# Patient Record
Sex: MALE | Race: WHITE | Employment: OTHER | URBAN - METROPOLITAN AREA
[De-identification: names, ages, dates, MRNs, and addresses within clinical notes are randomized per-mention and may not be internally consistent; named-entity substitution may affect disease eponyms.]

---

## 2018-11-13 PROBLEM — E11.9 TYPE 2 DIABETES MELLITUS WITHOUT COMPLICATION, WITHOUT LONG-TERM CURRENT USE OF INSULIN (HCC): Status: ACTIVE | Noted: 2018-11-13

## 2019-07-24 ENCOUNTER — TELEPHONE (OUTPATIENT)
Dept: ADMINISTRATIVE | Age: 84
End: 2019-07-24

## 2019-07-24 NOTE — TELEPHONE ENCOUNTER
New pt referred for follow up of CAD of native artery. Appt scheduled with Dr. Jorja Cooks on 9/17/19. Pt has seen Dr. Chi Solis in the past at [address] [facility]. He states he has not seen a cardiologist since his last visit in 2014. Cardiac past history form scanned.

## 2019-09-17 ENCOUNTER — OFFICE VISIT (OUTPATIENT)
Dept: CARDIOLOGY CLINIC | Age: 84
End: 2019-09-17
Payer: MEDICARE

## 2019-09-17 VITALS
HEIGHT: 70 IN | BODY MASS INDEX: 27.92 KG/M2 | RESPIRATION RATE: 16 BRPM | DIASTOLIC BLOOD PRESSURE: 74 MMHG | WEIGHT: 195 LBS | HEART RATE: 59 BPM | SYSTOLIC BLOOD PRESSURE: 132 MMHG

## 2019-09-17 DIAGNOSIS — I25.10 CORONARY ARTERY DISEASE INVOLVING NATIVE CORONARY ARTERY OF NATIVE HEART WITHOUT ANGINA PECTORIS: Primary | Chronic | ICD-10-CM

## 2019-09-17 PROCEDURE — 93000 ELECTROCARDIOGRAM COMPLETE: CPT | Performed by: INTERNAL MEDICINE

## 2019-09-17 PROCEDURE — 99214 OFFICE O/P EST MOD 30 MIN: CPT | Performed by: INTERNAL MEDICINE

## 2020-12-10 ENCOUNTER — OFFICE VISIT (OUTPATIENT)
Dept: CARDIOLOGY CLINIC | Age: 85
End: 2020-12-10
Payer: MEDICARE

## 2020-12-10 VITALS
HEIGHT: 70 IN | BODY MASS INDEX: 27.63 KG/M2 | SYSTOLIC BLOOD PRESSURE: 132 MMHG | WEIGHT: 193 LBS | RESPIRATION RATE: 16 BRPM | DIASTOLIC BLOOD PRESSURE: 72 MMHG | HEART RATE: 52 BPM

## 2020-12-10 PROCEDURE — 93000 ELECTROCARDIOGRAM COMPLETE: CPT | Performed by: INTERNAL MEDICINE

## 2020-12-10 PROCEDURE — 99213 OFFICE O/P EST LOW 20 MIN: CPT | Performed by: INTERNAL MEDICINE

## 2020-12-10 NOTE — PROGRESS NOTES
OUTPATIENT CARDIOLOGY FOLLOW-UP    Name: Alma Han    Age: 82 y.o. Primary Care Physician: Gabby Capone MD    Date of Service: 12/10/2020    Chief Complaint: Follow-up for CAD, PVC's    Interim History:  He was previously followed by Dr. Evens Mcclure; he established care with me in 9/2019. No new cardiac complaints since last cardiology evaluation. He denies recent chest pain, SOB, palpitations, lightheadedness, dizziness, syncope, PND, or orthopnea. He enjoys golfing (3-4 times/week; he used to golf with my grandpa). He is a retired damon. SR on EKG.     Review of Systems:   Cardiac: As per HPI  General: No fever, chills  Pulmonary: As per HPI  HEENT: No visual disturbances, difficult swallowing  GI: No nausea, vomiting  : No dysuria, hematuria  Endocrine: No thyroid disease, +DM  Musculoskeletal: ARTEAGA x 4, no focal motor deficits  Skin: Intact, no rashes  Neuro: No headache, seizures  Psych: Currently with no depression, anxiety    Past Medical History:  Past Medical History:   Diagnosis Date    CAD (coronary artery disease)     followed with Dr. Larry Hameed, as of 6/2015 Dr. Arnulfo Parker. Britni Orellana follows    Hernia cerebri ([facility] [address]     Hyperlipidemia     Hypertension     stable per patient    On aspirin at home     presribed by Dr. Aguila Cadena    Osteoarthritis     left knee    Type II or unspecified type diabetes mellitus without mention of complication, not stated as uncontrolled     since lost weight was taken off sugar pill     Past Surgical History:  Past Surgical History:   Procedure Laterality Date    COLONOSCOPY  6/16/2015    CORONARY ARTERY BYPASS GRAFT  2004    HERNIA REPAIR  1950's    inguinal    JOINT REPLACEMENT Left 2/22/2013    left total knee arthroplasty    TONSILLECTOMY       Family History:  Family History   Problem Relation Age of Onset    Heart Disease Mother     Cancer Father      Social History:  Social History     Socioeconomic History    Marital status:  Spouse name: Not on file    Number of children: Not on file    Years of education: Not on file    Highest education level: Not on file   Occupational History    Not on file   Social Needs    Financial resource strain: Not on file    Food insecurity     Worry: Not on file     Inability: Not on file    Transportation needs     Medical: Not on file     Non-medical: Not on file   Tobacco Use    Smoking status: Former Smoker     Packs/day: 1.50     Years: 25.00     Pack years: 37.50     Types: Cigarettes     Last attempt to quit: 1983     Years since quittin.9    Smokeless tobacco: Never Used   Substance and Sexual Activity    Alcohol use:  Yes     Alcohol/week: 2.0 standard drinks     Types: 2 Cans of beer per week     Comment: occasionally 2 drinks    Drug use: No    Sexual activity: Not on file   Lifestyle    Physical activity     Days per week: Not on file     Minutes per session: Not on file    Stress: Not on file   Relationships    Social connections     Talks on phone: Not on file     Gets together: Not on file     Attends Cheondoism service: Not on file     Active member of club or organization: Not on file     Attends meetings of clubs or organizations: Not on file     Relationship status: Not on file    Intimate partner violence     Fear of current or ex partner: Not on file     Emotionally abused: Not on file     Physically abused: Not on file     Forced sexual activity: Not on file   Other Topics Concern    Not on file   Social History Narrative    Not on file     Allergies:  No Known Allergies    Current Medications:  Current Outpatient Medications   Medication Sig Dispense Refill    amLODIPine (NORVASC) 5 MG tablet Take 1 tablet by mouth daily 90 tablet 1    metFORMIN (GLUCOPHAGE) 500 MG tablet Take 2 tablets by mouth 2 times daily (with meals) 360 tablet 1    metoprolol succinate (TOPROL XL) 50 MG extended release tablet Take 1 tablet by mouth daily 90 tablet 1    rosuvastatin (CRESTOR) 5 MG tablet Take 1 tablet by mouth daily (Patient taking differently: Take 5 mg by mouth every other day TAKES 3 TIMES WEEKLY) 90 tablet 1    Handicap Placard MISC by Does not apply route Valid from 9/26/16 to 9/26/21  Dx osteoarthritis 2 each 0    acetaminophen (TYLENOL) 500 MG tablet   Take 1,000 mg by mouth daily Instructed to take morning of surgery with a sip of water if needs      multivitamin [facility]) per tablet   Take 1 tablet by mouth daily       aspirin 81 MG tablet   Take 81 mg by mouth daily Last dose  to check with Dr. Dione Olivas if needs to hold       No current facility-administered medications for this visit. Physical Exam:  /72   Pulse 52   Resp 16   Ht 5' 10\" (1.778 m)   Wt 193 lb (87.5 kg)   BMI 27.69 kg/m²   Wt Readings from Last 3 Encounters:   12/10/20 193 lb (87.5 kg)   05/11/20 196 lb 3.2 oz (89 kg)   11/11/19 187 lb (84.8 kg)     Appearance: Awake, alert and oriented x 3, no acute respiratory distress  Skin: Intact, no rash  Head: Normocephalic, atraumatic  Eyes: EOMI, no conjunctival erythema  ENMT: No pharyngeal erythema, MMM, no rhinorrhea  Neck: Supple, no carotid bruits  Lungs: Clear to auscultation bilaterally. No wheezes, rales, or rhonchi.   Cardiac: Regular rate and rhythm, 1/6 systolic murmur  Abdomen: Soft, nontender, +bowel sounds  Extremities: Moves all extremities x 4, no lower extremity edema  Neurologic: No focal motor deficits apparent, normal mood and affect, alert and oriented x 3    Laboratory Tests:  Lab Results   Component Value Date    CREATININE 1.0 03/28/2013    BUN 24 (H) 03/28/2013     03/28/2013    K 4.8 03/28/2013     (H) 03/28/2013    CO2 25 03/28/2013     Lab Results   Component Value Date    WBC 8.8 03/26/2013    HGB 11.9 (L) 03/26/2013    HCT 36.8 (L) 03/26/2013    MCV 95.5 03/26/2013     03/26/2013     Lab Results   Component Value Date    ALT 21 03/25/2013    AST 18 03/25/2013    ALKPHOS 67 03/25/2013 BILITOT 0.5 03/25/2013     Lab Results   Component Value Date    INR 1.6 03/25/2013    INR 2.1 02/25/2013    INR 2.3 02/24/2013    PROTIME 15.1 (H) 03/25/2013    PROTIME 17.5 (H) 02/25/2013    PROTIME 18.4 (H) 02/24/2013     Lab Results   Component Value Date    LABA1C 6.2 05/11/2020     Cardiac Tests:  ECG: SB, rate 52, NSSTT changes    Echocardiogram: 8/4/17 (Dr. Trujillo Handler)   Normal left ventricular ejection fraction.   Ejection fraction is visually estimated at 55-65%. Normal right ventricular size and function.   Indeterminate diastolic function   The left atrium is mildly dilated.   Mild centrally directed mitral regurgitation   The aortic valve appears mildly sclerotic.   RVSP is 40 mmHg.   Pulmonary hypertension is mild .   No pericardial effusion. Exercise nuclear stress test:  5/10/17 (Dr. Mary Capellan)  1. Exercise EKG was negative. 2. The patient experienced no chest pain with exercise. 3. The myocardial perfusion imaging was normal.    4. Overall left ventricular systolic function was normal without regional wall motion abnormalities. 5. Suggs treadmill score was 4.5.    6. Exercise capacity was below average.    7. Low risk exercise treadmill test.  8. Gated SPECT left ventricular ejection fraction was calculated to be 63%, with normal myocardial thickening and wall motion. ASSESSMENT / PLAN:  1. CAD s/p CABG in 12/2004 (LIMA-LAD, SVG-OM, SVG-RCA) -- negative stress test in 5/2017  2. HTN -- BP today 132/72, BP at last office visit 132/74, on norvasc and metoprolol  3. History of PVC's -- on metoprolol, normal EF  4. DM -- Hgb A1c 6.3 --> 6.2  5. HLD -- on statin  6.  Remote tobacco abuse    - Prior cardiac studies reviewed  - Continue current medications (including ASA, statin, and metoprolol)     Kameron Castillo MD  [facility]) Cardiology

## 2021-01-13 NOTE — PROGRESS NOTES
Have you been tested for COVID  No scheduled for COVID test 1-14-21 for OR scheduled 1-19-21          Have you been told you were positive for COVID No  Have you had any known exposure to someone that is positive for COVID No  Do you have a cough                   No              Do you have shortness of breath No                 Do you have a sore throat            No                Are you having chills                    No                Are you having muscle aches. No                    Please come to the hospital wearing a mask and have your significant other wear a mask as well. Both of you should check your temperature before leaving to come here,  if it is 100 or higher please call [phone] for instruction. [facility] PRE-ADMISSION TESTING INSTRUCTIONS    The Preadmission Testing patient is instructed accordingly using the following criteria (check applicable):    ARRIVAL INSTRUCTIONS:  [x] Parking the day of Surgery is located in the Main Entrance lot. Upon entering the door, make an immediate right to the surgery reception desk    [x] Bring photo ID and insurance card    [] Bring in a copy of Living will or Durable Power of  papers.     [x] Please be sure to arrange for responsible adult to provide transportation to and from the hospital    [x] Please arrange for responsible adult to be with you for the 24 hour period post procedure due to having anesthesia      GENERAL INSTRUCTIONS:    [x] Nothing by mouth after midnight, including gum, candy, mints or water    [x] You may brush your teeth, but do not swallow any water    [x] Take medications as instructed with 1-2 oz of water    [x] Stop herbal supplements and vitamins 5 days prior to procedure    [x] Follow preop dosing of blood thinners per physician instructions    [] Take 1/2 dose of evening insulin, but no insulin after midnight    [x] No oral diabetic medications after midnight    [x] If diabetic and have low blood sugar or feel symptomatic, take 1-2oz apple juice only    [] Bring inhalers day of surgery    [] Bring C-PAP/ Bi-Pap day of surgery    [] Bring urine specimen day of surgery    [] Shower or bath with soap, lather and rinse well, AM of Surgery, no lotion, powders or creams to surgical site    [] Follow bowel prep as instructed per surgeon    [x] No tobacco products within 24 hours of surgery     [x] No alcohol or illegal drug use within 24 hours of surgery.     [x] Jewelry, body piercing's, eyeglasses, contact lenses and dentures are not permitted into surgery (bring cases)      [] Please do not wear any nail polish, make up or hair products on the day of surgery    [x] You can expect a call the business day prior to procedure to notify you if your arrival time changes    [x] If you receive a survey after surgery we would greatly appreciate your comments    [] Parent/guardian of a minor must accompany their child and remain on the premises  the entire time they are under our care     [] Pediatric patients may bring favorite toy, blanket or comfort item with them    [] A caregiver or family member must remain with the patient during their stay if they are mentally handicapped, have dementia, disoriented or unable to use a call light or would be a safety concern if left unattended    [x] Please notify surgeon if you develop any illness between now and time of surgery (cold, cough, sore throat, fever, nausea, vomiting) or any signs of infections  including skin, wounds, and dental.    [x]  The Outpatient Pharmacy is available to fill your prescription here on your day of surgery, ask your preop nurse for details    [x] Other instructions    EDUCATIONAL MATERIALS PROVIDED:    [] PAT Preoperative Education Packet/Booklet     [] Medication List    [] Transfusion bracelet applied with instructions    [] Shower with soap, lather and rinse well, and use CHG wipes provided the evening before surgery as instructed    [] Incentive spirometer with instructions

## 2021-01-14 ENCOUNTER — HOSPITAL ENCOUNTER (OUTPATIENT)
Age: 86
Discharge: HOME | End: 2021-01-16
Payer: MEDICARE

## 2021-01-14 ENCOUNTER — PREP FOR PROCEDURE (OUTPATIENT)
Dept: GASTROENTEROLOGY | Age: 86
End: 2021-01-14

## 2021-01-14 DIAGNOSIS — Z01.818 PREOP TESTING: ICD-10-CM

## 2021-01-14 PROCEDURE — U0003 INFECTIOUS AGENT DETECTION BY NUCLEIC ACID (DNA OR RNA); SEVERE ACUTE RESPIRATORY SYNDROME CORONAVIRUS 2 (SARS-COV-2) (CORONAVIRUS DISEASE [COVID-19]), AMPLIFIED PROBE TECHNIQUE, MAKING USE OF HIGH THROUGHPUT TECHNOLOGIES AS DESCRIBED BY CMS-2020-01-R: HCPCS

## 2021-01-14 RX ORDER — SODIUM CHLORIDE 0.9 % (FLUSH) 0.9 %
10 SYRINGE (ML) INJECTION EVERY 12 HOURS SCHEDULED
Status: CANCELLED | OUTPATIENT
Start: 2021-01-14

## 2021-01-14 RX ORDER — SODIUM CHLORIDE 9 MG/ML
INJECTION, SOLUTION INTRAVENOUS CONTINUOUS
Status: CANCELLED | OUTPATIENT
Start: 2021-01-14

## 2021-01-14 RX ORDER — SODIUM CHLORIDE 0.9 % (FLUSH) 0.9 %
10 SYRINGE (ML) INJECTION PRN
Status: CANCELLED | OUTPATIENT
Start: 2021-01-14

## 2021-01-14 NOTE — H&P (VIEW-ONLY)
Key Haely   : 1935    Referring Provider:    Gulshan Burnett M.D. Problem:  Dysphagia. Michelle Payne is now 82years of age. He was last in this office in 2015 at which point he underwent a colonoscopy. Colonoscopy was done for screening purposes and yielded a small 2-3 mm adenomatous polyp in the cecum. Another polyp slightly larger was removed from the ascending colon. At this point, his complaint is related to the upper GI tract. He suggests rolls stick. Upper sternum. He actually points right at the midline. He chews well. It takes him a long time to finish. Mostly solids, breads, etc.  He seems to suggest symptoms are worsening. He does not seem to have issues to suggest reflux. Sometimes if food bolus arrests, he induces vomiting. He underwent an endoscopic evaluation under similar circumstances here and elsewhere in the past.  In , he underwent a tight ring-like stricture at the GE junction without esophagitis. The endoscope would not pass. He was only dilated, at that time, to 12 mm. When he was seen here in , he had some issues with dysphagia but not disabling. He also had a duodenal ulcer identified then. He had undergone an esophageal dilatation through the office of Dr. Michael Hubbard in . The endoscope passed without dilatation as opposed to  when dilatation was first required before doing so. He was dilated, at that point, to 48-Portuguese. PMH/Surgical Hx:  Coronary artery bypass grafting in , several colonoscopies and several EGDs, as well as a knee replacement. He has a history of hyperlipidemia and hypertension. Current Medications:   Metoprolol, amlodipine, metformin, Crestor, multivitamin, and 81 mg of aspirin. Allergies:  NKDA.     Objective Weight 194 pounds. Height 70 inches. Blood pressure 154/86. Pulse regular. Examination of the head, ears, eyes, nose and throat reveals no pallor or scleral icterus. No neck vein elevation or adenopathy. Lungs clear. Heart tones normal.  Regular rate and regular rhythm. No audible murmur or gallop. Soft, benign, and nontender abdomen without organomegaly, ascites or dependent edema. Assessment  Esophageal stricture. Previously described as a ring-like narrowing but length seemed to suggest not a typical Schatzkis ring. It has been years since dilated. Plan  EGD with dilatation. Tentatively scheduled [facility] 01/19/2021.

## 2021-01-15 LAB
SARS-COV-2: NOT DETECTED
SOURCE: NORMAL

## 2021-01-19 ENCOUNTER — ANESTHESIA EVENT (OUTPATIENT)
Dept: ENDOSCOPY | Age: 86
End: 2021-01-19
Payer: MEDICARE

## 2021-01-19 ENCOUNTER — ANESTHESIA (OUTPATIENT)
Dept: ENDOSCOPY | Age: 86
End: 2021-01-19
Payer: MEDICARE

## 2021-01-19 ENCOUNTER — HOSPITAL ENCOUNTER (OUTPATIENT)
Age: 86
Setting detail: OUTPATIENT SURGERY
Discharge: HOME | End: 2021-01-19
Attending: INTERNAL MEDICINE | Admitting: INTERNAL MEDICINE
Payer: MEDICARE

## 2021-01-19 VITALS
WEIGHT: 192 LBS | OXYGEN SATURATION: 96 % | DIASTOLIC BLOOD PRESSURE: 64 MMHG | TEMPERATURE: 97.7 F | RESPIRATION RATE: 16 BRPM | HEART RATE: 56 BPM | HEIGHT: 70 IN | SYSTOLIC BLOOD PRESSURE: 116 MMHG | BODY MASS INDEX: 27.49 KG/M2

## 2021-01-19 VITALS
DIASTOLIC BLOOD PRESSURE: 58 MMHG | RESPIRATION RATE: 10 BRPM | SYSTOLIC BLOOD PRESSURE: 106 MMHG | OXYGEN SATURATION: 97 %

## 2021-01-19 DIAGNOSIS — Z01.818 PREOP TESTING: Primary | ICD-10-CM

## 2021-01-19 LAB — METER GLUCOSE: 118 MG/DL (ref 74–99)

## 2021-01-19 PROCEDURE — 2709999900 HC NON-CHARGEABLE SUPPLY: Performed by: INTERNAL MEDICINE

## 2021-01-19 PROCEDURE — 3700000000 HC ANESTHESIA ATTENDED CARE: Performed by: INTERNAL MEDICINE

## 2021-01-19 PROCEDURE — 82962 GLUCOSE BLOOD TEST: CPT

## 2021-01-19 PROCEDURE — C1726 CATH, BAL DIL, NON-VASCULAR: HCPCS | Performed by: INTERNAL MEDICINE

## 2021-01-19 PROCEDURE — 7100000011 HC PHASE II RECOVERY - ADDTL 15 MIN: Performed by: INTERNAL MEDICINE

## 2021-01-19 PROCEDURE — 6360000002 HC RX W HCPCS: Performed by: NURSE ANESTHETIST, CERTIFIED REGISTERED

## 2021-01-19 PROCEDURE — 2580000003 HC RX 258: Performed by: INTERNAL MEDICINE

## 2021-01-19 PROCEDURE — 3700000001 HC ADD 15 MINUTES (ANESTHESIA): Performed by: INTERNAL MEDICINE

## 2021-01-19 PROCEDURE — 3609017700 HC EGD DILATION GASTRIC/DUODENAL STRICTURE: Performed by: INTERNAL MEDICINE

## 2021-01-19 PROCEDURE — 7100000010 HC PHASE II RECOVERY - FIRST 15 MIN: Performed by: INTERNAL MEDICINE

## 2021-01-19 RX ORDER — SODIUM CHLORIDE 0.9 % (FLUSH) 0.9 %
10 SYRINGE (ML) INJECTION EVERY 12 HOURS SCHEDULED
Status: DISCONTINUED | OUTPATIENT
Start: 2021-01-19 | End: 2021-01-19 | Stop reason: HOSPADM

## 2021-01-19 RX ORDER — SODIUM CHLORIDE 0.9 % (FLUSH) 0.9 %
10 SYRINGE (ML) INJECTION PRN
Status: DISCONTINUED | OUTPATIENT
Start: 2021-01-19 | End: 2021-01-19 | Stop reason: HOSPADM

## 2021-01-19 RX ORDER — PROPOFOL 10 MG/ML
INJECTION, EMULSION INTRAVENOUS PRN
Status: DISCONTINUED | OUTPATIENT
Start: 2021-01-19 | End: 2021-01-19 | Stop reason: SDUPTHER

## 2021-01-19 RX ORDER — SODIUM CHLORIDE 9 MG/ML
INJECTION, SOLUTION INTRAVENOUS CONTINUOUS
Status: DISCONTINUED | OUTPATIENT
Start: 2021-01-19 | End: 2021-01-19 | Stop reason: HOSPADM

## 2021-01-19 RX ORDER — LIDOCAINE HYDROCHLORIDE 20 MG/ML
INJECTION, SOLUTION INTRAVENOUS PRN
Status: DISCONTINUED | OUTPATIENT
Start: 2021-01-19 | End: 2021-01-19 | Stop reason: SDUPTHER

## 2021-01-19 RX ADMIN — LIDOCAINE HYDROCHLORIDE 50 MG: 20 INJECTION, SOLUTION INTRAVENOUS at 08:56

## 2021-01-19 RX ADMIN — SODIUM CHLORIDE: 9 INJECTION, SOLUTION INTRAVENOUS at 08:37

## 2021-01-19 RX ADMIN — PROPOFOL 220 MG: 10 INJECTION, EMULSION INTRAVENOUS at 08:56

## 2021-01-19 ASSESSMENT — PAIN SCALES - GENERAL: PAINLEVEL_OUTOF10: 0

## 2021-01-19 NOTE — PROGRESS NOTES
Went over discharge instructions with patient and wife. Both verbalized understanding of information. Waiting for doctor to come and see the patient before discharge.

## 2021-01-19 NOTE — INTERVAL H&P NOTE
Update History & Physical    The patient's History and Physical of January 14, 2021 was reviewed with the patient and I examined the patient. There was no change. The surgical site was confirmed by the patient and me. Plan: The risks, benefits, expected outcome, and alternative to the recommended procedure have been discussed with the patient. Patient understands and wants to proceed with the procedure.      Electronically signed by Ry Douglas MD on 1/19/2021 at 9:24 AM

## 2021-01-19 NOTE — ANESTHESIA POSTPROCEDURE EVALUATION
Department of Anesthesiology  Postprocedure Note    Patient: Donnie Hernandez  MRN: [id number]  YOB: 1935  Date of evaluation: 1/19/2021  Time:  12:16 PM     Procedure Summary     Date: 01/19/21 Room / Location: [facility] 03 / [facility]    Anesthesia Start: 8220 Anesthesia Stop: 7011    Procedure: EGD DILATION BALLOON (N/A ) Diagnosis: (ESOPHAGEAL STRICTURE)    Surgeons: Faustino uPga MD Responsible Provider: Kunal Nielsen MD    Anesthesia Type: MAC ASA Status: 3          Anesthesia Type: MAC    Brown Phase I: Brown Score: 10    Brown Phase II: Brown Score: 10    Last vitals: Reviewed and per EMR flowsheets.        Anesthesia Post Evaluation    Patient location during evaluation: PACU  Patient participation: complete - patient participated  Level of consciousness: awake and alert  Airway patency: patent  Nausea & Vomiting: no nausea and no vomiting  Complications: no  Cardiovascular status: hemodynamically stable  Respiratory status: acceptable  Hydration status: euvolemic

## 2021-01-19 NOTE — BRIEF OP NOTE
Brief Postoperative Note      Patient: Krishan Alvarez  YOB: 1935  MRN: [id number]    Date of Procedure: 1/19/2021    Pre-Op Diagnosis: ESOPHAGEAL STRICTURE    Post-Op Diagnosis: dilation to 12 mm by balloon, duodenal stricture       Procedure(s):  EGD DILATION BALLOON    Surgeon(s):  Scotty Perea MD    Assistant:  * No surgical staff found *    Anesthesia: Monitor Anesthesia Care    Estimated Blood Loss (mL): Minimal    Complications: None    Specimens:   * No specimens in log *    Implants:  * No implants in log *      Drains: * No LDAs found *    Findings: tight stricture, duoenal stricture    Electronically signed by Scotty Perea MD on 1/19/2021 at 9:16 AM

## 2021-01-21 NOTE — OP NOTE
[address] [address]                                OPERATIVE REPORT    PATIENT NAME: Rasta Pang                     :        1935  MED REC NO:   [id number]                            ROOM:  ACCOUNT NO:   [de-identified]                           ADMIT DATE: 2021  PROVIDER:     Angela Singh MD    DATE OF PROCEDURE:  2021    PROCEDURES:  Esophagogastroduodenoscopy plus esophageal dilatation. PREOPERATIVE DIAGNOSES:  Dysphagia, history of distal esophageal  stricture, and history of duodenal ulcer disease. POSTOPERATIVE DIAGNOSES:  1. Tight narrowing of the gastroesophageal junction requiring  esophageal dilatation prior to entry into the stomach; no active  esophagitis, Bui's, or neoplasm; stricture rather than ring. 2.  Small hiatal hernia. 3.  Unremarkable stomach. 4.  Normal bulb with symmetric smooth bland stricture at the apex of the  bulbar just beyond. INDICATION:  An 26-year-old male who presents with solid food dysphagia. He has undergone an endoscopic evaluation elsewhere in , for which  he underwent dilatation by [employer] [facility] to 48-Dominican. When I saw him for an  endoscopic evaluation for dysphagia in , there was a tight  narrowing, probably erroneously interpreted as a ring, for which he  underwent dilatation, but only to 12 mm. Dysphagia seemed to be quelled  for a long period of time, only did recently resume without weight loss  or heartburn. At the time of his endoscopy in , he was recognized  as having a duodenal ulcer. Preop is monitored anesthesia care. DESCRIPTION OF PROCEDURE:  He was placed in the left lateral decubitus  position and sedated. A bite block was in place. The Olympus GIF-H190  video endoscope was guided into the cervical esophagus under direct  vision.   Proximal mid esophagus normal.  Little bit of mucoid debris in

## 2021-01-25 ENCOUNTER — IMMUNIZATION (OUTPATIENT)
Dept: PRIMARY CARE CLINIC | Age: 86
End: 2021-01-25
Payer: MEDICARE

## 2021-01-25 DIAGNOSIS — Z23 NEED FOR VACCINATION: Primary | ICD-10-CM

## 2021-01-25 PROCEDURE — 0001A COVID-19, PFIZER VACCINE 30MCG/0.3ML DOSE: CPT | Performed by: PHYSICIAN ASSISTANT

## 2021-01-25 PROCEDURE — 91300 COVID-19, PFIZER VACCINE 30MCG/0.3ML DOSE: CPT | Performed by: PHYSICIAN ASSISTANT

## 2021-02-10 RX ORDER — SODIUM CHLORIDE 0.9 % (FLUSH) 0.9 %
10 SYRINGE (ML) INJECTION PRN
Status: CANCELLED | OUTPATIENT
Start: 2021-02-10

## 2021-02-10 RX ORDER — SODIUM CHLORIDE 0.9 % (FLUSH) 0.9 %
10 SYRINGE (ML) INJECTION EVERY 12 HOURS SCHEDULED
Status: CANCELLED | OUTPATIENT
Start: 2021-02-10

## 2021-02-10 RX ORDER — SODIUM CHLORIDE 9 MG/ML
INJECTION, SOLUTION INTRAVENOUS CONTINUOUS
Status: CANCELLED | OUTPATIENT
Start: 2021-02-10

## 2021-02-11 ENCOUNTER — HOSPITAL ENCOUNTER (OUTPATIENT)
Age: 86
Discharge: HOME | End: 2021-02-13
Payer: MEDICARE

## 2021-02-11 DIAGNOSIS — Z01.818 PREOP TESTING: ICD-10-CM

## 2021-02-11 PROCEDURE — U0003 INFECTIOUS AGENT DETECTION BY NUCLEIC ACID (DNA OR RNA); SEVERE ACUTE RESPIRATORY SYNDROME CORONAVIRUS 2 (SARS-COV-2) (CORONAVIRUS DISEASE [COVID-19]), AMPLIFIED PROBE TECHNIQUE, MAKING USE OF HIGH THROUGHPUT TECHNOLOGIES AS DESCRIBED BY CMS-2020-01-R: HCPCS

## 2021-02-11 NOTE — PROGRESS NOTES
Have you been tested for COVID  Yes  Tested on 2-11-21 for OR scheduled 2-16-21        Have you been told you were positive for COVID No  Have you had any known exposure to someone that is positive for COVID No  Do you have a cough                   No              Do you have shortness of breath No                 Do you have a sore throat            No                Are you having chills                    No                Are you having muscle aches. No                    Please come to the hospital wearing a mask and have your significant other wear a mask as well. Both of you should check your temperature before leaving to come here,  if it is 100 or higher please call [phone] for instruction. [facility] PRE-ADMISSION [facility]ING INSTRUCTIONS    The Preadmission Testing patient is instructed accordingly using the following criteria (check applicable):    ARRIVAL INSTRUCTIONS:  [x] Parking the day of Surgery is located in the Main Entrance lot. Upon entering the door, make an immediate right to the surgery reception desk    [x] Bring photo ID and insurance card    [] Bring in a copy of Living will or Durable Power of  papers.     [x] Please be sure to arrange for responsible adult to provide transportation to and from the hospital    [x] Please arrange for responsible adult to be with you for the 24 hour period post procedure due to having anesthesia      GENERAL INSTRUCTIONS:    [x] Nothing by mouth after midnight, including gum, candy, mints or water    [x] You may brush your teeth, but do not swallow any water    [x] Take medications as instructed with 1-2 oz of water    [x] Stop herbal supplements and vitamins 5 days prior to procedure    [x] Follow preop dosing of blood thinners per physician instructions    [] Take 1/2 dose of evening insulin, but no insulin after midnight    [x] No oral diabetic medications after midnight    [x] If diabetic and have low blood sugar or feel symptomatic, take 1-2oz apple juice only    [] Bring inhalers day of surgery    [] Bring C-PAP/ Bi-Pap day of surgery    [] Bring urine specimen day of surgery    [] Shower or bath with soap, lather and rinse well, AM of Surgery, no lotion, powders or creams to surgical site    [x] Follow bowel prep as instructed per surgeon    [x] No tobacco products within 24 hours of surgery     [x] No alcohol or illegal drug use within 24 hours of surgery.     [x] Jewelry, body piercing's, eyeglasses, contact lenses and dentures are not permitted into surgery (bring cases)      [] Please do not wear any nail polish, make up or hair products on the day of surgery    [x] You can expect a call the business day prior to procedure to notify you if your arrival time changes    [x] If you receive a survey after surgery we would greatly appreciate your comments    [] Parent/guardian of a minor must accompany their child and remain on the premises  the entire time they are under our care     [] Pediatric patients may bring favorite toy, blanket or comfort item with them    [] A caregiver or family member must remain with the patient during their stay if they are mentally handicapped, have dementia, disoriented or unable to use a call light or would be a safety concern if left unattended    [x] Please notify surgeon if you develop any illness between now and time of surgery (cold, cough, sore throat, fever, nausea, vomiting) or any signs of infections  including skin, wounds, and dental.    [x]  The Outpatient Pharmacy is available to fill your prescription here on your day of surgery, ask your preop nurse for details    [x] Other instructions    EDUCATIONAL MATERIALS PROVIDED:    [] PAT Preoperative Education Packet/Booklet     [] Medication List    [] Transfusion bracelet applied with instructions    [] Shower with soap, lather and rinse well, and use CHG wipes provided the evening before surgery as instructed    [] Incentive spirometer with instructions

## 2021-02-13 LAB
SARS-COV-2: NOT DETECTED
SOURCE: NORMAL

## 2021-02-15 ENCOUNTER — IMMUNIZATION (OUTPATIENT)
Dept: PRIMARY CARE CLINIC | Age: 86
End: 2021-02-15
Payer: MEDICARE

## 2021-02-15 PROCEDURE — 0002A COVID-19, PFIZER VACCINE 30MCG/0.3ML DOSE: CPT | Performed by: NURSE PRACTITIONER

## 2021-02-15 PROCEDURE — 91300 COVID-19, PFIZER VACCINE 30MCG/0.3ML DOSE: CPT | Performed by: NURSE PRACTITIONER

## 2021-02-16 ENCOUNTER — ANESTHESIA EVENT (OUTPATIENT)
Dept: ENDOSCOPY | Age: 86
End: 2021-02-16
Payer: MEDICARE

## 2021-02-16 ENCOUNTER — HOSPITAL ENCOUNTER (OUTPATIENT)
Age: 86
Setting detail: OUTPATIENT SURGERY
Discharge: HOME | End: 2021-02-16
Attending: INTERNAL MEDICINE | Admitting: INTERNAL MEDICINE
Payer: MEDICARE

## 2021-02-16 ENCOUNTER — ANESTHESIA (OUTPATIENT)
Dept: ENDOSCOPY | Age: 86
End: 2021-02-16
Payer: MEDICARE

## 2021-02-16 VITALS
HEIGHT: 70 IN | DIASTOLIC BLOOD PRESSURE: 58 MMHG | OXYGEN SATURATION: 96 % | BODY MASS INDEX: 26.92 KG/M2 | WEIGHT: 188 LBS | SYSTOLIC BLOOD PRESSURE: 122 MMHG | RESPIRATION RATE: 16 BRPM | HEART RATE: 60 BPM | TEMPERATURE: 96.8 F

## 2021-02-16 VITALS
SYSTOLIC BLOOD PRESSURE: 99 MMHG | OXYGEN SATURATION: 99 % | RESPIRATION RATE: 19 BRPM | DIASTOLIC BLOOD PRESSURE: 61 MMHG

## 2021-02-16 DIAGNOSIS — Z01.818 PREOP TESTING: Primary | ICD-10-CM

## 2021-02-16 LAB — METER GLUCOSE: 124 MG/DL (ref 74–99)

## 2021-02-16 PROCEDURE — 3609017700 HC EGD DILATION GASTRIC/DUODENAL STRICTURE: Performed by: INTERNAL MEDICINE

## 2021-02-16 PROCEDURE — 6360000002 HC RX W HCPCS: Performed by: NURSE ANESTHETIST, CERTIFIED REGISTERED

## 2021-02-16 PROCEDURE — 3700000000 HC ANESTHESIA ATTENDED CARE: Performed by: INTERNAL MEDICINE

## 2021-02-16 PROCEDURE — 2580000003 HC RX 258: Performed by: NURSE ANESTHETIST, CERTIFIED REGISTERED

## 2021-02-16 PROCEDURE — 3700000001 HC ADD 15 MINUTES (ANESTHESIA): Performed by: INTERNAL MEDICINE

## 2021-02-16 PROCEDURE — 82962 GLUCOSE BLOOD TEST: CPT

## 2021-02-16 PROCEDURE — C1726 CATH, BAL DIL, NON-VASCULAR: HCPCS | Performed by: INTERNAL MEDICINE

## 2021-02-16 PROCEDURE — 7100000010 HC PHASE II RECOVERY - FIRST 15 MIN: Performed by: INTERNAL MEDICINE

## 2021-02-16 PROCEDURE — 2709999900 HC NON-CHARGEABLE SUPPLY: Performed by: INTERNAL MEDICINE

## 2021-02-16 PROCEDURE — 7100000011 HC PHASE II RECOVERY - ADDTL 15 MIN: Performed by: INTERNAL MEDICINE

## 2021-02-16 RX ORDER — SODIUM CHLORIDE 9 MG/ML
INJECTION, SOLUTION INTRAVENOUS CONTINUOUS
Status: DISCONTINUED | OUTPATIENT
Start: 2021-02-16 | End: 2021-02-16 | Stop reason: HOSPADM

## 2021-02-16 RX ORDER — SODIUM CHLORIDE 0.9 % (FLUSH) 0.9 %
10 SYRINGE (ML) INJECTION EVERY 12 HOURS SCHEDULED
Status: DISCONTINUED | OUTPATIENT
Start: 2021-02-16 | End: 2021-02-16 | Stop reason: HOSPADM

## 2021-02-16 RX ORDER — PROPOFOL 10 MG/ML
INJECTION, EMULSION INTRAVENOUS PRN
Status: DISCONTINUED | OUTPATIENT
Start: 2021-02-16 | End: 2021-02-16 | Stop reason: SDUPTHER

## 2021-02-16 RX ORDER — SODIUM CHLORIDE 9 MG/ML
INJECTION, SOLUTION INTRAVENOUS CONTINUOUS PRN
Status: DISCONTINUED | OUTPATIENT
Start: 2021-02-16 | End: 2021-02-16 | Stop reason: SDUPTHER

## 2021-02-16 RX ORDER — SODIUM CHLORIDE 0.9 % (FLUSH) 0.9 %
10 SYRINGE (ML) INJECTION PRN
Status: DISCONTINUED | OUTPATIENT
Start: 2021-02-16 | End: 2021-02-16 | Stop reason: HOSPADM

## 2021-02-16 RX ADMIN — SODIUM CHLORIDE: 9 INJECTION, SOLUTION INTRAVENOUS at 08:55

## 2021-02-16 RX ADMIN — PROPOFOL 200 MG: 10 INJECTION, EMULSION INTRAVENOUS at 09:04

## 2021-02-16 NOTE — ANESTHESIA POSTPROCEDURE EVALUATION
Department of Anesthesiology  Postprocedure Note    Patient: Geovany Cheung  MRN: [id number]  YOB: 1935  Date of evaluation: 2/16/2021  Time:  9:38 AM     Procedure Summary     Date: 02/16/21 Room / Location: [facility] 03 / [facility]    Anesthesia Start: 5422 Anesthesia Stop: 8081    Procedure: EGD DILATION BALLOON (N/A ) Diagnosis: (ESOPHAGEAL STRICTURE)    Surgeons: Odessa Aponte MD Responsible Provider: Sun Dexter MD    Anesthesia Type: MAC ASA Status: 3          Anesthesia Type: MAC    Brown Phase I: Brown Score: 10    Brown Phase II: Brown Score: 10    Last vitals: Reviewed and per EMR flowsheets.        Anesthesia Post Evaluation    Patient location during evaluation: PACU  Patient participation: complete - patient participated  Level of consciousness: awake and alert  Airway patency: patent  Nausea & Vomiting: no nausea and no vomiting  Complications: no  Cardiovascular status: hemodynamically stable  Respiratory status: acceptable  Hydration status: euvolemic

## 2021-02-16 NOTE — BRIEF OP NOTE
Brief Postoperative Note      Patient: Marianne Parent  YOB: 1935  MRN: [id number]    Date of Procedure: 2/16/2021    Pre-Op Diagnosis: ESOPHAGEAL STRICTURE    Post-Op Diagnosis: Same, dilated 8 thru 15       Procedure(s):  EGD DILATION BALLOON    Surgeon(s):  Bobby Landaverde MD    Assistant:  * No surgical staff found *    Anesthesia: Monitor Anesthesia Care    Estimated Blood Loss (mL): Minimal    Complications: None immediate    Specimens:   * No specimens in log *    Implants:  * No implants in log *      Drains: * No LDAs found *    Findings: tight stricture GE jxn, duodenal stricture    Electronically signed by Bobby Landaverde MD on 2/16/2021 at 9:27 AM

## 2021-02-16 NOTE — ANESTHESIA PRE PROCEDURE
Department of Anesthesiology  Preprocedure Note       Name:  Mercy Tinsley   Age:  82 y.o.  :  1935                                          MRN:  [id number]         Date:  2021      Surgeon: Bria Barrios):  Teto Otto MD    Procedure: Procedure(s):  EGD ESOPHAGOGASTRODUODENOSCOPY    Medications prior to admission:   Prior to Admission medications    Medication Sig Start Date End Date Taking?  Authorizing Provider   amLODIPine (NORVASC) 5 MG tablet Take 1 tablet by mouth daily 20  Yes Enrico Shepard MD   metFORMIN (GLUCOPHAGE) 500 MG tablet Take 2 tablets by mouth 2 times daily (with meals) 20  Yes Enrico Shepard MD   rosuvastatin (CRESTOR) 5 MG tablet Take 1 tablet by mouth daily  Patient taking differently: Take 5 mg by mouth every other day TAKES 3 TIMES WEEKLY 19  Yes Enrico Shepard MD   Handicap Placard MISC by Does not apply route Valid from 16 to 21  Dx osteoarthritis 16  Yes Enrico Shepard MD   acetaminophen (TYLENOL) 500 MG tablet   Take 1,000 mg by mouth daily Instructed to take morning of surgery with a sip of water if needs   Yes Historical Provider, MD   multivitamin (THERAGRAN) per tablet   Take 1 tablet by mouth daily    Yes Historical Provider, MD   aspirin 81 MG tablet   Take 81 mg by mouth daily Last dose  to check with Dr. Stacie Riley if needs to hold   Yes Historical Provider, MD   metoprolol succinate (TOPROL XL) 50 MG extended release tablet Take 1 tablet by mouth daily 20   Enrico Shepard MD       Current medications:    Current Facility-Administered Medications   Medication Dose Route Frequency Provider Last Rate Last Admin    0.9 % sodium chloride infusion   Intravenous Continuous Teto Otto MD        sodium chloride flush 0.9 % injection 10 mL  10 mL Intravenous 2 times per day Teto Otto MD        sodium chloride flush 0.9 % injection 10 mL  10 mL Intravenous PRN Teto Otto MD Allergies:  No Known Allergies    Problem List:    Patient Active Problem List   Diagnosis Code    Hyperlipidemia E78.5    Hypertension I10    Osteoarthritis M19.90    CAD (coronary artery disease) I25.10    Osteoarthritis of left knee M17.12    S/P CABG x 3 Z95.1    Type 2 diabetes mellitus without complication, without long-term current use of insulin (HCC) E11.9       Past Medical History:        Diagnosis Date    CAD (coronary artery disease)     Dr. Ricardo Chamberlain - 12-10-20     Difficulty swallowing     for EGD 21    Hyperlipidemia     Hypertension     stable per patient    On aspirin at home     presribed by Dr. Catarino Malagon    Osteoarthritis     left knee    Type II or unspecified type diabetes mellitus without mention of complication, not stated as uncontrolled     since lost weight was taken off sugar pill       Past Surgical History:        Procedure Laterality Date    COLONOSCOPY  2015    CORONARY ARTERY BYPASS GRAFT  2004    HERNIA REPAIR  's    inguinal    JOINT REPLACEMENT Left 2013    left total knee arthroplasty    TONSILLECTOMY      UPPER GASTROINTESTINAL ENDOSCOPY N/A 2021    EGD DILATION BALLOON performed by Dima Correa MD at [facility] ENDOSCOPY       Social History:    Social History     Tobacco Use    Smoking status: Former Smoker     Packs/day: 1.50     Years: 25.00     Pack years: 37.50     Types: Cigarettes     Quit date: 1983     Years since quittin.1    Smokeless tobacco: Never Used   Substance Use Topics    Alcohol use:  Yes     Alcohol/week: 2.0 standard drinks     Types: 2 Cans of beer per week     Comment: occasionally 2 drinks                                Counseling given: Not Answered      Vital Signs (Current):   Vitals:    21 0951   Weight: 188 lb (85.3 kg)   Height: 5' 10\" (1.778 m)                                              BP Readings from Last 3 Encounters:   21 116/64   21 (!) 106/58   12/10/20 132/72 NPO Status: Time of last liquid consumption: 2200                                                                               BMI:   Wt Readings from Last 3 Encounters:   02/11/21 188 lb (85.3 kg)   01/19/21 192 lb (87.1 kg)   12/10/20 193 lb (87.5 kg)     Body mass index is 26.98 kg/m². CBC:   Lab Results   Component Value Date    WBC 8.8 03/26/2013    RBC 3.85 03/26/2013    HGB 11.9 03/26/2013    HCT 36.8 03/26/2013    MCV 95.5 03/26/2013    RDW 14.1 03/26/2013     03/26/2013       CMP:   Lab Results   Component Value Date     03/28/2013    K 4.8 03/28/2013     03/28/2013    CO2 25 03/28/2013    BUN 24 03/28/2013    CREATININE 1.0 03/28/2013    LABGLOM >60 03/28/2013    GLUCOSE 115 05/11/2020    PROT 6.4 03/25/2013    CALCIUM 8.3 03/28/2013    BILITOT 0.5 03/25/2013    ALKPHOS 67 03/25/2013    AST 18 03/25/2013    ALT 21 03/25/2013       POC Tests: No results for input(s): POCGLU, POCNA, POCK, POCCL, POCBUN, POCHEMO, POCHCT in the last 72 hours.     Coags:   Lab Results   Component Value Date    PROTIME 15.1 03/25/2013    INR 1.6 03/25/2013       HCG (If Applicable): No results found for: PREGTESTUR, PREGSERUM, HCG, HCGQUANT     ABGs: No results found for: PHART, PO2ART, URL1TZS, YNZ1IAJ, BEART, M2TBCJUB     Type & Screen (If Applicable):  No results found for: LABABO, LABRH    Drug/Infectious Status (If Applicable):  No results found for: HIV, HEPCAB    COVID-19 Screening (If Applicable):   Lab Results   Component Value Date    COVID19 Not Detected 02/11/2021         Anesthesia Evaluation  Patient summary reviewed no history of anesthetic complications:   Airway: Mallampati: I  TM distance: >3 FB   Neck ROM: full   Dental: normal exam         Pulmonary:Negative Pulmonary ROS breath sounds clear to auscultation                            ROS comment: Former Smoker   Cardiovascular:    (+) hypertension:, CAD:, CABG/stent:, hyperlipidemia        Rhythm: regular  Rate: normal Beta Blocker:  Dose within 24 Hrs         Neuro/Psych:   Negative Neuro/Psych ROS              GI/Hepatic/Renal:            ROS comment: Difficulty swallowing. Endo/Other:    (+) DiabetesType II DM, , : arthritis: OA., .                 Abdominal:           Vascular: negative vascular ROS. Anesthesia Plan      MAC     ASA 3       Induction: intravenous. Anesthetic plan and risks discussed with patient. Plan discussed with CRNA.                   Isabel Helms MD   2/16/2021

## 2021-02-17 NOTE — OP NOTE
[address] [address]                                OPERATIVE REPORT    PATIENT NAME: Katja Giang                   :        1935  MED REC NO:   [id number]                            ROOM:  ACCOUNT NO:   [de-identified]                           ADMIT DATE: 2021  PROVIDER:     Alexx Hill MD    DATE OF PROCEDURE:  2021    SURGEON:  Alexx Hill MD    PROCEDURES PERFORMED:  Esophagogastroduodenoscopy plus balloon  dilatation of distal esophageal stricture. POSTOPERATIVE DIAGNOSES:  1. Recurrent stricture without esophagitis or neoplasm. 2.  Normal stomach. 3.  [place] stricture second portion of duodenum, which was not traversed. INDICATION:  He is an 74-year-old male with a history of distal  esophageal stricture, who had undergone dilatation on several occasions  in the past, about a month ago underwent dilatation from an estimated  diameter of 6 mm to 12 mm. Symptoms remain significant. Preop is monitored anesthesia care. DESCRIPTION OF PROCEDURE:  With he in left lateral a bite block in  place, the Olympus GIF-H190 video endoscope was guided into the cervical  esophagus. Proximal and mid esophagus normal.  Symmetric stricture  again encountered at the gastroesophageal junction. It was not  traversable. TTS balloon measuring 8 and 9 mm were inflated. Then the  gun malfunctioned, so 10 mm was not achieved. While the gun was being  changed, it was found the endoscope would now pass into the stomach. Proximal stomach normal.  Distal stomach normal.  Bulb normal.   Postbulbar duodenum remarkable for a bland previously described  stricture. The endoscope was withdrawn through the GE junction. Balloon dilatations from 10, 11, 12, 13.5 and 15 were achieved. There  was no major mucosal tear or disruption. The stomach was decompressed. Procedure terminated.     EBL 0 IMPRESSION:  [place] stricture, GE junction dilated now to 15 mm. Some  degree of retraction anticipated.         Nunu Andrade MD    D: 02/16/2021 10:36:16       T: 02/16/2021 10:43:23     [email or web address]  Job#: [id number]     Doc#: [id number]    CC:  Abraham Garnett MD

## 2021-03-10 NOTE — H&P
[address] [facility]                  [address]                       PREOPERATIVE HISTORY AND PHYSICAL    PATIENT NAME: Katja Giang                   :        1935  MED REC NO:   [id number]                            ROOM:  ACCOUNT NO:   [de-identified]                           ADMIT DATE: 2021  PROVIDER:     Alexx Hill MD    DATE OF ADMISSION:  2021    DATE OF PROCEDURE:  2021    PROBLEM:  Dysphagia. SUBJECTIVE:  The patient is 82years of age, known stricture, undergoing  repeat esophageal dilatation. PAST SURGICAL HISTORY:  Coronary artery bypass grafting, several  colonoscopies, EGDs, and knee replacement. PAST MEDICAL HISTORY:  Medical problems include hyperlipidemia and  hypertension. CURRENT MEDICATIONS:  Metoprolol, Norvasc, metformin, Crestor,  multivitamin, 81 mg of aspirin. ALLERGIES:  No listed allergies. OBJECTIVE:  VITAL SIGNS:  Weight is 194 pounds, height is 70 inches, blood pressure  154/86, pulse regular. HEART:  Normal.  LUNGS:  Normal.  ABDOMEN:  Benign. ASSESSMENT:  Esophageal stricture. PLAN:  EGD with dilatation.         Isai Ace MD    D: 2021 21:41:17       T: 2021 21:47:19     [email or web address]  Job#: [id number]     Doc#: [id number]    CC:

## 2021-10-05 ENCOUNTER — HOSPITAL ENCOUNTER (OUTPATIENT)
Dept: CT IMAGING | Age: 86
Discharge: HOME | End: 2021-10-07
Payer: MEDICARE

## 2021-10-05 DIAGNOSIS — R31.0 GROSS HEMATURIA: ICD-10-CM

## 2021-10-05 PROCEDURE — 74176 CT ABD & PELVIS W/O CONTRAST: CPT

## 2022-03-17 NOTE — PROGRESS NOTES
[facility] PRE-ADMISSION TESTING INSTRUCTIONS      Have you been tested for COVID  No           Have you been told you were positive for COVID No  Have you had any known exposure to someone that is positive for COVID No  Do you have a cough                   no              Do you have shortness of breath No                 Do you have a sore throat            No                Are you having chills                    No                Are you having muscle aches. no                   Please come to the hospital wearing a mask and have your significant other wear a mask as well. Both of you should check your temperature before leaving to come here,  if it is 100 or higher please call [phone] for instruction. ARRIVAL INSTRUCTIONS:  [x] Parking the day of Surgery is located in the [facility]. Upon entering the main door make an immediate right to the surgery reception desk. [x] Bring photo ID and insurance card    [] Bring in a copy of Living will or Durable Power of  papers.     [x] Please be sure to arrange for responsible adult to provide transportation to and from the hospital    [x] Please arrange for responsible adult to be with you for the 24 hour period post procedure due to having anesthesia      GENERAL INSTRUCTIONS:    [x] Nothing by mouth after midnight, including gum, candy, mints or water    [x] You may brush your teeth, but do not swallow any water    [x] Take medications as instructed with 1-2 oz of water    [x] Stop herbal supplements and vitamins 5 days prior to procedure    [x] Follow preop dosing of blood thinners per physician instructions    [] Take 1/2 dose of evening insulin, but no insulin after midnight    [x] No oral diabetic medications after midnight    [x] If diabetic and have low blood sugar or feel symptomatic, take 1-2oz apple juice only    [] Bring inhalers day of surgery    [] Bring C-PAP/ Bi-Pap day of surgery    [] Bring urine specimen day of surgery    [x] Shower or bath with soap, lather and rinse well, AM of Surgery, no lotion, powders or creams to surgical site    [] Follow bowel prep as instructed per surgeon    [x] No tobacco products within 24 hours of surgery     [x] No alcohol or illegal drug use within 24 hours of surgery.     [x] Jewelry, body piercing's, eyeglasses, contact lenses and dentures are not permitted into surgery (bring cases)      [x] Please do not wear any nail polish, make up or hair products on the day of surgery    [x] You can expect a call the business day prior to procedure to notify you if your arrival time changes    [x] If you receive a survey after surgery we would greatly appreciate your comments    [x] Please notify surgeon if you develop any illness between now and time of surgery (cold, cough, sore throat, fever, nausea, vomiting) or any signs of infections  including skin, wounds, and dental.    []  The [facility]patient [facility] is available to fill your prescription here on your day of surgery, ask your preop nurse for details

## 2022-03-21 ENCOUNTER — ANESTHESIA EVENT (OUTPATIENT)
Dept: ENDOSCOPY | Age: 87
End: 2022-03-21
Payer: MEDICARE

## 2022-03-21 ENCOUNTER — PREP FOR PROCEDURE (OUTPATIENT)
Dept: GASTROENTEROLOGY | Age: 87
End: 2022-03-21

## 2022-03-21 RX ORDER — SODIUM CHLORIDE 0.9 % (FLUSH) 0.9 %
5-40 SYRINGE (ML) INJECTION EVERY 12 HOURS SCHEDULED
Status: CANCELLED | OUTPATIENT
Start: 2022-03-21

## 2022-03-21 RX ORDER — SODIUM CHLORIDE 0.9 % (FLUSH) 0.9 %
5-40 SYRINGE (ML) INJECTION PRN
Status: CANCELLED | OUTPATIENT
Start: 2022-03-21

## 2022-03-21 RX ORDER — SODIUM CHLORIDE 9 MG/ML
INJECTION, SOLUTION INTRAVENOUS CONTINUOUS
Status: CANCELLED | OUTPATIENT
Start: 2022-03-21

## 2022-03-21 RX ORDER — SODIUM CHLORIDE 9 MG/ML
25 INJECTION, SOLUTION INTRAVENOUS PRN
Status: CANCELLED | OUTPATIENT
Start: 2022-03-21

## 2022-03-21 NOTE — H&P (VIEW-ONLY)
PATIENT NAME: Perla Gordon NUMBER: [de-identified]  YOB: 1935  DATE: 03/16/2022  REFERRING PHYSICIAN: Mary Lou Singleton M.D. PROBLEM: Dysphagia, known stricture. SUBJECTIVE: He is now 82years of age. He was last seen under similar circumstances in February a year ago. He  thought it longer. An endoscopic evaluation was completed again for a tight distal esophageal narrowing. The  photographs would suggest the presence of a ring, but the area involved certainly seemed longer in length. There has  never been evidence of peptic esophagitis and he was evaluated remotely through the office of Dr. Teri Mckeon in 2008. At  that time, the distal esophageal narrowing or stricture was identified and dilated by [facility] dilators. He was evaluated  under similar circumstances for the first time through this office in 2013. He also had an incidental duodenal ulcer at that  time without ever having documented recurrence. His last endoscopy was a year ago. Again, he thought it was longer  when asked when his symptoms began to reemerge, he really was unable to say so. Medications may cause a problem. None of them seem particularly large, though he does take a multivitamin. He suggests he had problems with a doughnut  the other day. His weight is unchanged from December 2020. He was evaluated by Urology last September for hematuria. That evaluation included at least a CT scan of the abdomen. His laboratory work in February of this year revealed a hemoglobin of 14.5 and hematocrit of 43. His creatinine was 1.24. His BUN was 18 and his liver function studies were normal.  PAST MEDICAL HISTORY: Includes coronary artery bypass grafting in 2004, several colonoscopies and several  endoscopic evaluations with esophageal dilatation, knee replacement, hypertension, and hyperlipidemia. MEDICATIONS: Metoprolol, amlodipine, metformin, Crestor, multivitamin, and 81 mg of aspirin. ALLERGIES: None known.   OBJECTIVE:

## 2022-03-21 NOTE — H&P (VIEW-ONLY)
PATIENT NAME: Melissa Mckeon NUMBER: [de-identified]  YOB: 1935  DATE: 03/16/2022  REFERRING PHYSICIAN: Patricia Santos M.D. PROBLEM: Dysphagia, known stricture. SUBJECTIVE: He is now 82years of age. He was last seen under similar circumstances in February a year ago. He  thought it longer. An endoscopic evaluation was completed again for a tight distal esophageal narrowing. The  photographs would suggest the presence of a ring, but the area involved certainly seemed longer in length. There has  never been evidence of peptic esophagitis and he was evaluated remotely through the office of Dr. Yolanda Mehta in 2008. At  that time, the distal esophageal narrowing or stricture was identified and dilated by [facility] dilators. He was evaluated  under similar circumstances for the first time through this office in 2013. He also had an incidental duodenal ulcer at that  time without ever having documented recurrence. His last endoscopy was a year ago. Again, he thought it was longer  when asked when his symptoms began to reemerge, he really was unable to say so. Medications may cause a problem. None of them seem particularly large, though he does take a multivitamin. He suggests he had problems with a doughnut  the other day. His weight is unchanged from December 2020. He was evaluated by Urology last September for hematuria. That evaluation included at least a CT scan of the abdomen. His laboratory work in February of this year revealed a hemoglobin of 14.5 and hematocrit of 43. His creatinine was 1.24. His BUN was 18 and his liver function studies were normal.  PAST MEDICAL HISTORY: Includes coronary artery bypass grafting in 2004, several colonoscopies and several  endoscopic evaluations with esophageal dilatation, knee replacement, hypertension, and hyperlipidemia. MEDICATIONS: Metoprolol, amlodipine, metformin, Crestor, multivitamin, and 81 mg of aspirin. ALLERGIES: None known.   OBJECTIVE: Height 5 feet 10 inches tall, weight 198 pounds, blood pressure 128/70. He is alert and oriented. He  lacks pallor and scleral icterus. There is no neck vein elevation or adenopathy. Lungs are clear. Heart tones are normal.  Regular rate. Regular rhythm. No audible murmur. No gallop. The abdomen is soft and nontender. There is no hepatic  or splenic enlargement. No ascites or dependent edema. ASSESSMENT: He has symptomatic stricture. He responded at least for reasonable length of time with two esophageal  dilatations. Comorbidities of hypertension, diabetes and heart disease seems stable. Reviewing his recent encounter with  Dr. Grecia Urrutia on 02/28/2022, there was no mention of the issues of dysphagia at that time. He has maintained his weight. In fact, the note specifically says no issues with swallowing. PLAN: Expeditious endoscopic evaluation with dilatation.  He chooses [place]

## 2022-03-22 ENCOUNTER — ANESTHESIA (OUTPATIENT)
Dept: ENDOSCOPY | Age: 87
End: 2022-03-22
Payer: MEDICARE

## 2022-03-22 ENCOUNTER — HOSPITAL ENCOUNTER (OUTPATIENT)
Age: 87
Setting detail: OUTPATIENT SURGERY
Discharge: HOME | End: 2022-03-22
Attending: INTERNAL MEDICINE | Admitting: INTERNAL MEDICINE
Payer: MEDICARE

## 2022-03-22 VITALS
SYSTOLIC BLOOD PRESSURE: 154 MMHG | DIASTOLIC BLOOD PRESSURE: 67 MMHG | OXYGEN SATURATION: 95 % | TEMPERATURE: 97.6 F | RESPIRATION RATE: 23 BRPM

## 2022-03-22 VITALS
RESPIRATION RATE: 16 BRPM | WEIGHT: 196 LBS | SYSTOLIC BLOOD PRESSURE: 135 MMHG | OXYGEN SATURATION: 96 % | HEART RATE: 60 BPM | TEMPERATURE: 96.8 F | DIASTOLIC BLOOD PRESSURE: 61 MMHG | BODY MASS INDEX: 28.06 KG/M2 | HEIGHT: 70 IN

## 2022-03-22 LAB — METER GLUCOSE: 122 MG/DL (ref 74–99)

## 2022-03-22 PROCEDURE — 7100000011 HC PHASE II RECOVERY - ADDTL 15 MIN: Performed by: INTERNAL MEDICINE

## 2022-03-22 PROCEDURE — 3609017700 HC EGD DILATION GASTRIC/DUODENAL STRICTURE: Performed by: INTERNAL MEDICINE

## 2022-03-22 PROCEDURE — 2709999900 HC NON-CHARGEABLE SUPPLY: Performed by: INTERNAL MEDICINE

## 2022-03-22 PROCEDURE — 2580000003 HC RX 258: Performed by: INTERNAL MEDICINE

## 2022-03-22 PROCEDURE — 7100000010 HC PHASE II RECOVERY - FIRST 15 MIN: Performed by: INTERNAL MEDICINE

## 2022-03-22 PROCEDURE — 6360000002 HC RX W HCPCS: Performed by: NURSE ANESTHETIST, CERTIFIED REGISTERED

## 2022-03-22 PROCEDURE — 82962 GLUCOSE BLOOD TEST: CPT

## 2022-03-22 PROCEDURE — C1726 CATH, BAL DIL, NON-VASCULAR: HCPCS | Performed by: INTERNAL MEDICINE

## 2022-03-22 PROCEDURE — 3700000000 HC ANESTHESIA ATTENDED CARE: Performed by: INTERNAL MEDICINE

## 2022-03-22 PROCEDURE — 3700000001 HC ADD 15 MINUTES (ANESTHESIA): Performed by: INTERNAL MEDICINE

## 2022-03-22 RX ORDER — SODIUM CHLORIDE 9 MG/ML
INJECTION, SOLUTION INTRAVENOUS CONTINUOUS
Status: DISCONTINUED | OUTPATIENT
Start: 2022-03-22 | End: 2022-03-22 | Stop reason: HOSPADM

## 2022-03-22 RX ORDER — PROPOFOL 10 MG/ML
INJECTION, EMULSION INTRAVENOUS PRN
Status: DISCONTINUED | OUTPATIENT
Start: 2022-03-22 | End: 2022-03-22 | Stop reason: SDUPTHER

## 2022-03-22 RX ORDER — SODIUM CHLORIDE 9 MG/ML
25 INJECTION, SOLUTION INTRAVENOUS PRN
Status: DISCONTINUED | OUTPATIENT
Start: 2022-03-22 | End: 2022-03-22 | Stop reason: HOSPADM

## 2022-03-22 RX ORDER — SODIUM CHLORIDE 0.9 % (FLUSH) 0.9 %
5-40 SYRINGE (ML) INJECTION EVERY 12 HOURS SCHEDULED
Status: DISCONTINUED | OUTPATIENT
Start: 2022-03-22 | End: 2022-03-22 | Stop reason: HOSPADM

## 2022-03-22 RX ORDER — SODIUM CHLORIDE 0.9 % (FLUSH) 0.9 %
5-40 SYRINGE (ML) INJECTION PRN
Status: DISCONTINUED | OUTPATIENT
Start: 2022-03-22 | End: 2022-03-22 | Stop reason: HOSPADM

## 2022-03-22 RX ADMIN — PROPOFOL 20 MG: 10 INJECTION, EMULSION INTRAVENOUS at 09:09

## 2022-03-22 RX ADMIN — PROPOFOL 100 MG: 10 INJECTION, EMULSION INTRAVENOUS at 09:04

## 2022-03-22 RX ADMIN — PROPOFOL 50 MG: 10 INJECTION, EMULSION INTRAVENOUS at 09:10

## 2022-03-22 RX ADMIN — SODIUM CHLORIDE: 9 INJECTION, SOLUTION INTRAVENOUS at 08:55

## 2022-03-22 ASSESSMENT — LIFESTYLE VARIABLES: SMOKING_STATUS: 0

## 2022-03-22 ASSESSMENT — PAIN SCALES - GENERAL
PAINLEVEL_OUTOF10: 0

## 2022-03-22 ASSESSMENT — PAIN - FUNCTIONAL ASSESSMENT: PAIN_FUNCTIONAL_ASSESSMENT: 0-10

## 2022-03-22 NOTE — BRIEF OP NOTE
Brief Postoperative Note      Patient: Jarek Thibodeaux  YOB: 1935  MRN: [id number]    Date of Procedure: 3/22/2022    Pre-Op Diagnosis: DYSPHAGIA/stricture    Post-Op Diagnosis: stricture dilated 6 to 12 mm, stricture apex of the bulb (Asx)       Procedure(s):  EGD DILATION BALLOON    Surgeon(s):  Aaron Baez MD    Assistant:  * No surgical staff found *    Anesthesia: Monitor Anesthesia Care    Estimated Blood Loss (mL): Minimal    Complications: None    Specimens:   * No specimens in log *    Implants:  * No implants in log *      Drains: * No LDAs found *    Findings: see above    Electronically signed by Aaron Baez MD on 3/22/2022 at 9:19 AM

## 2022-03-22 NOTE — ANESTHESIA PRE PROCEDURE
Department of Anesthesiology  Preprocedure Note       Name:  Janey Jose   Age:  82 y.o.  :  1935                                          MRN:  [id number]         Date:  3/22/2022      Surgeon: Rhett Cadena):  Luis Das MD    Procedure: Procedure(s):  EGD ESOPHAGOGASTRODUODENOSCOPY    Medications prior to admission:   Prior to Admission medications    Medication Sig Start Date End Date Taking?  Authorizing Provider   amLODIPine (NORVASC) 5 MG tablet Take 1 tablet by mouth daily 20   Enrico Olivas MD   metFORMIN (GLUCOPHAGE) 500 MG tablet Take 2 tablets by mouth 2 times daily (with meals) 20   Enrico Olivas MD   metoprolol succinate (TOPROL XL) 50 MG extended release tablet Take 1 tablet by mouth daily 20   Enrico Olivas MD   rosuvastatin (CRESTOR) 5 MG tablet Take 1 tablet by mouth daily  Patient taking differently: Take 5 mg by mouth every other day TAKES 3 TIMES WEEKLY 19   Enrico Olivas MD   Handicap Placard MISC by Does not apply route Valid from 16 to 21  Dx osteoarthritis 16   Glen Garcia MD   acetaminophen (TYLENOL) 500 MG tablet   Take 1,000 mg by mouth daily Instructed to take morning of surgery with a sip of water if needs    Historical Provider, MD   multivitamin [facility]) per tablet   Take 1 tablet by mouth daily     Historical Provider, MD   aspirin 81 MG tablet   Take 81 mg by mouth daily Last dose  to check with Dr. Karen Velez if needs to hold    Historical Provider, MD       Current medications:    Current Facility-Administered Medications   Medication Dose Route Frequency Provider Last Rate Last Admin    0.9 % sodium chloride infusion   IntraVENous Continuous Luis Das MD        0.9 % sodium chloride infusion  25 mL IntraVENous PRN Luis Das MD        sodium chloride flush 0.9 % injection 5-40 mL  5-40 mL IntraVENous 2 times per day Luis Das MD        sodium chloride flush 0.9 % injection 5-40 mL  5-40 mL IntraVENous PRN Luis Das MD           Allergies:  No Known Allergies    Problem List:    Patient Active Problem List   Diagnosis Code    Hyperlipidemia E78.5    Hypertension I10    Osteoarthritis M19.90    CAD (coronary artery disease) I25.10    Osteoarthritis of left knee M17.12    S/P CABG x 3 Z95.1    Type 2 diabetes mellitus without complication, without long-term current use of insulin ([facility]) E11.9       Past Medical History:        Diagnosis Date    CAD (coronary artery disease)     Dr. George Hernandez - 12-10-20     Difficulty swallowing     for EGD 21    Hyperlipidemia     Hypertension     stable per patient    On aspirin at home     presribed by Dr. Lana Pop    Osteoarthritis     left knee    Type II or unspecified type diabetes mellitus without mention of complication, not stated as uncontrolled     since lost weight was taken off sugar pill       Past Surgical History:        Procedure Laterality Date    COLONOSCOPY  2015    CORONARY ARTERY BYPASS GRAFT  2004    HERNIA REPAIR  's    inguinal    JOINT REPLACEMENT Left 2013    left total knee arthroplasty    TONSILLECTOMY      UPPER GASTROINTESTINAL ENDOSCOPY N/A 2021    EGD DILATION BALLOON performed by Luis Das MD at [facility] N/A 2021    EGD DILATION BALLOON performed by Luis Das MD at [facility] ENDOSCOPY       Social History:    Social History     Tobacco Use    Smoking status: Former Smoker     Packs/day: 1.50     Years: 25.00     Pack years: 37.50     Types: Cigarettes     Quit date: 1983     Years since quittin.2    Smokeless tobacco: Never Used   Substance Use Topics    Alcohol use:  Yes     Alcohol/week: 2.0 standard drinks     Types: 2 Cans of beer per week     Comment: occasionally 2 drinks                                Counseling given: Not Answered      Vital Signs (Current):   Vitals:    22 1550 03/22/22 0810   Weight: 196 lb (88.9 kg) 196 lb (88.9 kg)   Height: 5' 10\" (1.778 m) 5' 10\" (1.778 m)                                              BP Readings from Last 3 Encounters:   02/16/21 (!) 122/58   02/16/21 99/61   01/19/21 116/64       NPO Status:                                                                                 BMI:   Wt Readings from Last 3 Encounters:   03/22/22 196 lb (88.9 kg)   02/11/21 188 lb (85.3 kg)   01/19/21 192 lb (87.1 kg)     Body mass index is 28.12 kg/m². CBC:   Lab Results   Component Value Date    WBC 8.8 03/26/2013    RBC 3.85 03/26/2013    HGB 11.9 03/26/2013    HCT 36.8 03/26/2013    MCV 95.5 03/26/2013    RDW 14.1 03/26/2013     03/26/2013       CMP:   Lab Results   Component Value Date     03/28/2013    K 4.8 03/28/2013     03/28/2013    CO2 25 03/28/2013    BUN 24 03/28/2013    CREATININE 1.0 03/28/2013    LABGLOM >60 03/28/2013    GLUCOSE 115 05/11/2020    PROT 6.4 03/25/2013    CALCIUM 8.3 03/28/2013    BILITOT 0.5 03/25/2013    ALKPHOS 67 03/25/2013    AST 18 03/25/2013    ALT 21 03/25/2013       POC Tests: No results for input(s): POCGLU, POCNA, POCK, POCCL, POCBUN, POCHEMO, POCHCT in the last 72 hours.     Coags:   Lab Results   Component Value Date    PROTIME 15.1 03/25/2013    INR 1.6 03/25/2013       HCG (If Applicable): No results found for: PREGTESTUR, PREGSERUM, HCG, HCGQUANT     ABGs: No results found for: PHART, PO2ART, RSB8GQA, BZI7ZQI, BEART, B7WNPUVN     Type & Screen (If Applicable):  No results found for: LABABO, LABRH    Drug/Infectious Status (If Applicable):  No results found for: HIV, HEPCAB    COVID-19 Screening (If Applicable):   Lab Results   Component Value Date    COVID19 Not Detected 02/11/2021           Anesthesia Evaluation  Patient summary reviewed and Nursing notes reviewed no history of anesthetic complications:   Airway: Mallampati: II  TM distance: >3 FB   Neck ROM: full  Mouth opening: > = 3 FB Dental: normal exam         Pulmonary:Negative Pulmonary ROS and normal exam        (-) not a current smoker                           Cardiovascular:  Exercise tolerance: good (>4 METS),   (+) hypertension:, CABG/stent:, hyperlipidemia        Rhythm: regular  Rate: normal           Beta Blocker:  Dose within 24 Hrs         Neuro/Psych:   Negative Neuro/Psych ROS              GI/Hepatic/Renal:            ROS comment: dysphagia. Endo/Other:    (+) DiabetesType II DM, , : arthritis:., .                 Abdominal:             Vascular: negative vascular ROS. Other Findings:           Anesthesia Plan      MAC     ASA 3       Induction: intravenous. Anesthetic plan and risks discussed with patient and spouse.                       Tiff Mahoney MD   3/22/2022

## 2022-03-22 NOTE — ANESTHESIA POSTPROCEDURE EVALUATION
Department of Anesthesiology  Postprocedure Note    Patient: Jhonatan Florentino  MRN: [id number]  YOB: 1935  Date of evaluation: 3/22/2022  Time:  9:22 AM     Procedure Summary     Date: 03/22/22 Room / Location: [address] / [facility]    Anesthesia Start: 9245 Anesthesia Stop: 8791    Procedure: EGD DILATION BALLOON (N/A ) Diagnosis: (DYSPHAGIA)    Surgeons: Elaina Xiong MD Responsible Provider: Mina Aparicio MD    Anesthesia Type: MAC ASA Status: 3          Anesthesia Type: MAC    Brown Phase I: Brown Score: 10    Brown Phase II:      Last vitals: Reviewed and per EMR flowsheets.        Anesthesia Post Evaluation    Patient location during evaluation: bedside  Patient participation: complete - patient participated  Level of consciousness: awake and alert  Pain score: 0  Airway patency: patent  Nausea & Vomiting: no vomiting and no nausea  Complications: no  Cardiovascular status: blood pressure returned to baseline  Respiratory status: acceptable, room air and spontaneous ventilation  Hydration status: stable  Multimodal analgesia pain management approach    MAGAN Mendes - CRNA

## 2022-03-22 NOTE — INTERVAL H&P NOTE
Update History & Physical    The patient's History and Physical of March 21, 2022 was reviewed with the patient and I examined the patient. There was no change. The surgical site was confirmed by the patient and me. Plan: The risks, benefits, expected outcome, and alternative to the recommended procedure have been discussed with the patient. Patient understands and wants to proceed with the procedure.      Electronically signed by Luis Das MD on 3/22/2022 at 8:53 AM

## 2022-03-23 NOTE — OP NOTE
[address] [address]                                OPERATIVE REPORT    PATIENT NAME: Susannah Vaughan                   :        1935  MED REC NO:   [id number]                            ROOM:  ACCOUNT NO:   [de-identified]                           ADMIT DATE: 2022  PROVIDER:     Emily Stanford MD    DATE OF PROCEDURE:  2022    PREOPERATIVE DIAGNOSIS:  Recurrent distal esophageal stricture. POSTOPERATIVE DIAGNOSES:  1. Recurrent distal esophageal stricture, dilated 12 mm. 2.  Unremarkable stomach. 3.  Smooth bland stricture, apex of the bulb, asymptomatic, no evidence  of active ulcer disease. INDICATIONS:  This is an 82-year-old male. He has a history of ulcer  disease and recurrent stricture in the distal esophagus, which has never  been ascertained to be related to peptic esophagitis. He has been  dilated relatively frequently in the past, last was a year ago up to 15  mm. He has recurrent symptoms. Preop is monitored anesthesia care. DESCRIPTION OF PROCEDURE:  With he in the left lateral decubitus  position, he was sedated. A bite block was in place. The Olympus  GIF-H190 video endoscope was guided into the oropharynx and into the  cervical esophagus. The proximal esophagus was normal.   Gastroesophageal junction again was a tight stricture, which did not  have the appearance of a definite ring. There was no esophagitis and  there was a little bit of medicinal debris above it. The endoscope  would not pass. TTS balloons 6 through 8 were inflated. Little impact. A 10-mm balloon was inflated. Then to 11 mm. Balloon was deflated. There was some mucosal disruption. The endoscope passed into the  stomach. Retroflexion at the proximal stomach revealed no  abnormalities. Distally, it was okay.   The pylorus was normal.  The  bulb was normal, but as previously noted at the apex of the bulb was a  smooth band-like stricture through which the endoscope was not passed. No active ulcer disease. The endoscope was withdrawn to the GE  junction. Another balloon was inflated to 12 mm and held for 30  seconds. Procedure was terminated and well tolerated. EBL: 0    IMPRESSION:  Sufficient dilatation to allow passage of the endoscope. There is a mucosal disruption of moderate extent, so additional  dilatations delayed moving forward.         Larry Naik MD    D: 03/22/2022 9:35:45       T: 03/22/2022 9:39:14     [email or web address]  Job#: [id number]     Doc#: [id number]    CC:  Dacia Kidd MD

## 2022-03-31 RX ORDER — SODIUM CHLORIDE 9 MG/ML
25 INJECTION, SOLUTION INTRAVENOUS PRN
Status: CANCELLED | OUTPATIENT
Start: 2022-03-31

## 2022-03-31 RX ORDER — SODIUM CHLORIDE 0.9 % (FLUSH) 0.9 %
5-40 SYRINGE (ML) INJECTION PRN
Status: CANCELLED | OUTPATIENT
Start: 2022-03-31

## 2022-03-31 RX ORDER — SODIUM CHLORIDE 0.9 % (FLUSH) 0.9 %
5-40 SYRINGE (ML) INJECTION EVERY 12 HOURS SCHEDULED
Status: CANCELLED | OUTPATIENT
Start: 2022-03-31

## 2022-03-31 RX ORDER — SODIUM CHLORIDE 9 MG/ML
INJECTION, SOLUTION INTRAVENOUS CONTINUOUS
Status: CANCELLED | OUTPATIENT
Start: 2022-03-31

## 2022-04-01 NOTE — PROGRESS NOTES
[facility] PRE-ADMISSION TESTING INSTRUCTIONS        Have you been tested for COVID  No           Have you been told you were positive for COVID no  Have you had any known exposure to someone that is positive for COVID No  Do you have a cou    gh No              Do you have shortness of breath No                 Do you have a sore throat            No                Are you having chills                    No                Are you having muscle aches. No                    Please come to the hospital wearing a mask and have your significant other wear a mask as well. Both of you should check your temperature before leaving to come here,  if it is 100 or higher please call [phone] for instruction. ARRIVAL INSTRUCTIONS:  [x] Parking the day of Surgery is located in the [facility]. Upon entering the main door make an immediate right to the surgery reception desk. [x] Bring photo ID and insurance card    [] Bring in a copy of Living will or Durable Power of  papers.     [x] Please be sure to arrange for responsible adult to provide transportation to and from the hospital    [x] Please arrange for responsible adult to be with you for the 24 hour period post procedure due to having anesthesia      GENERAL INSTRUCTIONS:    [x] Nothing by mouth after midnight, including gum, candy, mints or water    [x] You may brush your teeth, but do not swallow any water    [x] Take medications as instructed with 1-2 oz of water    [x] Stop herbal supplements and vitamins 5 days prior to procedure    [x] Follow preop dosing of blood thinners per physician instructions    [] Take 1/2 dose of evening insulin, but no insulin after midnight    [x] No oral diabetic medications after midnight    [x] If diabetic and have low blood sugar or feel symptomatic, take 1-2oz apple juice only    [] Bring inhalers day of surgery    [] Bring C-PAP/ Bi-Pap day of surgery    [] Bring urine specimen day of surgery    [x] Shower or bath with soap, lather and rinse well, AM of Surgery, no lotion, powders or creams to surgical site    [x] Follow bowel prep as instructed per surgeon    [x] No tobacco products within 24 hours of surgery     [x] No alcohol or illegal drug use within 24 hours of surgery.     [x] Jewelry, body piercing's, eyeglasses, contact lenses and dentures are not permitted into surgery (bring cases)      [x] Please do not wear any nail polish, make up or hair products on the day of surgery    [x] You can expect a call the business day prior to procedure to notify you if your arrival time changes    [x] If you receive a survey after surgery we would greatly appreciate your comments    [x] Please notify surgeon if you develop any illness between now and time of surgery (cold, cough, sore throat, fever, nausea, vomiting) or any signs of infections  including skin, wounds, and dental.    []  The Outpatient Pharmacy is available to fill your prescription here on your day of surgery, ask your preop nurse for details

## 2022-04-04 ENCOUNTER — ANESTHESIA EVENT (OUTPATIENT)
Dept: ENDOSCOPY | Age: 87
End: 2022-04-04
Payer: MEDICARE

## 2022-04-04 ASSESSMENT — LIFESTYLE VARIABLES: SMOKING_STATUS: 0

## 2022-04-04 NOTE — ANESTHESIA PRE PROCEDURE
Department of Anesthesiology  Preprocedure Note       Name:  Jaylyn Carrillo   Age:  82 y.o.  :  1935                                          MRN:  [id number]         Date:  2022      Surgeon: Brandy Packer):  Latanya Skelton MD    Procedure: Procedure(s):  EGD ESOPHAGOGASTRODUODENOSCOPY WITH  DILATION    Medications prior to admission:   Prior to Admission medications    Medication Sig Start Date End Date Taking? Authorizing Provider   amLODIPine (NORVASC) 5 MG tablet Take 1 tablet by mouth daily 20   Milind Puentes MD   metFORMIN (GLUCOPHAGE) 500 MG tablet Take 2 tablets by mouth 2 times daily (with meals) 20   Glen Garcia MD   metoprolol succinate (TOPROL XL) 50 MG extended release tablet Take 1 tablet by mouth daily 20   Milind Puentes MD   rosuvastatin (CRESTOR) 5 MG tablet Take 1 tablet by mouth daily  Patient taking differently: Take 5 mg by mouth every other day TAKES 3 TIMES WEEKLY 19   Milind Puentes MD   Handicap Placard MISC by Does not apply route Valid from 16 to 21  Dx osteoarthritis 16   Glen Garcia MD   acetaminophen (TYLENOL) 500 MG tablet   Take 1,000 mg by mouth daily Instructed to take morning of surgery with a sip of water if needs    Historical Provider, MD   multivitamin [facility]) per tablet   Take 1 tablet by mouth daily     Historical Provider, MD   aspirin 81 MG tablet   Take 81 mg by mouth daily Last dose  to check with Dr. Meir Mariano if needs to hold    Historical Provider, MD       Current medications:    No current facility-administered medications for this encounter.      Current Outpatient Medications   Medication Sig Dispense Refill    amLODIPine (NORVASC) 5 MG tablet Take 1 tablet by mouth daily 90 tablet 1    metFORMIN (GLUCOPHAGE) 500 MG tablet Take 2 tablets by mouth 2 times daily (with meals) 360 tablet 1    metoprolol succinate (TOPROL XL) 50 MG extended release tablet Take 1 tablet by mouth daily 90 tablet 1    rosuvastatin (CRESTOR) 5 MG tablet Take 1 tablet by mouth daily (Patient taking differently: Take 5 mg by mouth every other day TAKES 3 TIMES WEEKLY) 90 tablet 1    Handicap Placard MISC by Does not apply route Valid from 9/26/16 to 9/26/21  Dx osteoarthritis 2 each 0    acetaminophen (TYLENOL) 500 MG tablet   Take 1,000 mg by mouth daily Instructed to take morning of surgery with a sip of water if needs      multivitamin (THERAGRAN) per tablet   Take 1 tablet by mouth daily       aspirin 81 MG tablet   Take 81 mg by mouth daily Last dose  to check with Dr. Hubbard if needs to hold         Allergies:  No Known Allergies    Problem List:    Patient Active Problem List   Diagnosis Code    Hyperlipidemia E78.5    Hypertension I10    Osteoarthritis M19.90    CAD (coronary artery disease) I25.10    Osteoarthritis of left knee M17.12    S/P CABG x 3 Z95.1    Type 2 diabetes mellitus without complication, without long-term current use of insulin ([facility] [address] E11.9       Past Medical History:        Diagnosis Date    CAD (coronary artery disease)     Dr. Raza Arrow - 12-10-20     Difficulty swallowing     for EGD 2-16-21    Hyperlipidemia     Hypertension     stable per patient    On aspirin at home     presribed by Dr. Allison Munoz    Osteoarthritis     left knee    Type II or unspecified type diabetes mellitus without mention of complication, not stated as uncontrolled     since lost weight was taken off sugar pill       Past Surgical History:        Procedure Laterality Date    COLONOSCOPY  6/16/2015    CORONARY ARTERY BYPASS GRAFT  2004    HERNIA REPAIR  1950's    inguinal    JOINT REPLACEMENT Left 2/22/2013    left total knee arthroplasty    TONSILLECTOMY      UPPER GASTROINTESTINAL ENDOSCOPY N/A 1/19/2021    EGD DILATION BALLOON performed by Kay Da Silva MD at [facility] N/A 2/16/2021    EGD DILATION BALLOON performed by Kay Da Silva, MD at [address] [facility] N/A 3/22/2022    EGD DILATION BALLOON performed by Kristi Gotti MD at [address] History:    Social History     Tobacco Use    Smoking status: Former Smoker     Packs/day: 1.50     Years: 25.00     Pack years: 37.50     Types: Cigarettes     Quit date: 1983     Years since quittin.2    Smokeless tobacco: Never Used   Substance Use Topics    Alcohol use: Yes     Alcohol/week: 2.0 standard drinks     Types: 2 Cans of beer per week     Comment: occasionally 2 drinks                                Counseling given: Not Answered      Vital Signs (Current):   Vitals:    22 1202   Weight: 196 lb (88.9 kg)   Height: 5' 10\" (1.778 m)                                              BP Readings from Last 3 Encounters:   22 (!) 154/67   22 135/61   21 (!) 122/58       NPO Status:                                                                                 BMI:   Wt Readings from Last 3 Encounters:   22 196 lb (88.9 kg)   21 188 lb (85.3 kg)   21 192 lb (87.1 kg)     Body mass index is 28.12 kg/m². CBC:   Lab Results   Component Value Date    WBC 8.8 2013    RBC 3.85 2013    HGB 11.9 2013    HCT 36.8 2013    MCV 95.5 2013    RDW 14.1 2013     2013       CMP:   Lab Results   Component Value Date     2013    K 4.8 2013     2013    CO2 25 2013    BUN 24 2013    CREATININE 1.0 2013    LABGLOM >60 2013    GLUCOSE 115 2020    PROT 6.4 2013    CALCIUM 8.3 2013    BILITOT 0.5 2013    ALKPHOS 67 2013    AST 18 2013    ALT 21 2013       POC Tests: No results for input(s): POCGLU, POCNA, POCK, POCCL, POCBUN, POCHEMO, POCHCT in the last 72 hours.     Coags:   Lab Results   Component Value Date    PROTIME 15.1 2013    INR 1.6 2013       HCG (If Applicable): No results found for: PREGTESTUR, PREGSERUM, HCG, HCGQUANT     ABGs: No results found for: PHART, PO2ART, QEK3YKM, UPU2KYA, BEART, X5KPDOIM     Type & Screen (If Applicable):  No results found for: LABABO, LABRH    Drug/Infectious Status (If Applicable):  No results found for: HIV, HEPCAB    COVID-19 Screening (If Applicable):   Lab Results   Component Value Date    COVID19 Not Detected 02/11/2021           Anesthesia Evaluation  Patient summary reviewed and Nursing notes reviewed no history of anesthetic complications:   Airway: Mallampati: III  TM distance: <3 FB   Neck ROM: full  Mouth opening: > = 3 FB Dental:          Pulmonary:Negative Pulmonary ROS and normal exam  breath sounds clear to auscultation      (-) COPD, asthma, sleep apnea and not a current smoker                           Cardiovascular:  Exercise tolerance: good (>4 METS),   (+) hypertension: moderate and no interval change, CAD: obstructive, CABG/stent (CABG x 3): no interval change, murmur (Grade II), pulmonary hypertension (PASP 40 mm Hg): mild, hyperlipidemia    (-) dysrhythmias,  angina and  CHF    ECG reviewed  Rhythm: regular  Rate: normal           Beta Blocker:  Dose within 24 Hrs      ROS comment: EKG:  Sinus  Bradycardia  - with rate variation   cv = 17  WITHIN NORMAL LIMITS    ECHO 2017:   Summary   Normal left ventricular ejection fraction. Ejection fraction is visually estimated at 55-65%. Indeterminate diastolic function   The left atrium is mildly dilated. Mild centrally directed mitral regurgitation   The aortic valve appears mildly sclerotic. Pulmonary hypertension is mild . No pericardial effusion. Neuro/Psych:   Negative Neuro/Psych ROS     (-) seizures, TIA and CVA           GI/Hepatic/Renal:        (-) hepatitis      ROS comment: Dysphagia.    Endo/Other:    (+) Diabetes (Currently diet controlled)Type II DM, well controlled, , : arthritis: OA., .    (-) blood dyscrasia        Pt had no PAT visit       Abdominal:         (-) obese       Vascular: negative vascular ROS. - DVT and PE. Other Findings:           Anesthesia Plan      MAC     ASA 3       Induction: intravenous. Anesthetic plan and risks discussed with patient. Plan discussed with CRNA.                   Tyron Shipley DO   4/4/2022

## 2022-04-05 ENCOUNTER — ANESTHESIA (OUTPATIENT)
Dept: ENDOSCOPY | Age: 87
End: 2022-04-05
Payer: MEDICARE

## 2022-04-05 ENCOUNTER — HOSPITAL ENCOUNTER (OUTPATIENT)
Age: 87
Setting detail: OUTPATIENT SURGERY
Discharge: HOME | End: 2022-04-05
Attending: INTERNAL MEDICINE | Admitting: INTERNAL MEDICINE
Payer: MEDICARE

## 2022-04-05 VITALS
HEIGHT: 70 IN | WEIGHT: 196 LBS | RESPIRATION RATE: 16 BRPM | OXYGEN SATURATION: 96 % | BODY MASS INDEX: 28.06 KG/M2 | HEART RATE: 50 BPM | DIASTOLIC BLOOD PRESSURE: 60 MMHG | TEMPERATURE: 97.4 F | SYSTOLIC BLOOD PRESSURE: 119 MMHG

## 2022-04-05 VITALS
DIASTOLIC BLOOD PRESSURE: 58 MMHG | SYSTOLIC BLOOD PRESSURE: 112 MMHG | OXYGEN SATURATION: 95 % | RESPIRATION RATE: 40 BRPM

## 2022-04-05 LAB — METER GLUCOSE: 104 MG/DL (ref 74–99)

## 2022-04-05 PROCEDURE — 3609017700 HC EGD DILATION GASTRIC/DUODENAL STRICTURE: Performed by: INTERNAL MEDICINE

## 2022-04-05 PROCEDURE — 2580000003 HC RX 258: Performed by: INTERNAL MEDICINE

## 2022-04-05 PROCEDURE — 7100000011 HC PHASE II RECOVERY - ADDTL 15 MIN: Performed by: INTERNAL MEDICINE

## 2022-04-05 PROCEDURE — 82962 GLUCOSE BLOOD TEST: CPT

## 2022-04-05 PROCEDURE — 3700000000 HC ANESTHESIA ATTENDED CARE: Performed by: INTERNAL MEDICINE

## 2022-04-05 PROCEDURE — C1726 CATH, BAL DIL, NON-VASCULAR: HCPCS | Performed by: INTERNAL MEDICINE

## 2022-04-05 PROCEDURE — 7100000010 HC PHASE II RECOVERY - FIRST 15 MIN: Performed by: INTERNAL MEDICINE

## 2022-04-05 PROCEDURE — 3700000001 HC ADD 15 MINUTES (ANESTHESIA): Performed by: INTERNAL MEDICINE

## 2022-04-05 PROCEDURE — 2709999900 HC NON-CHARGEABLE SUPPLY: Performed by: INTERNAL MEDICINE

## 2022-04-05 PROCEDURE — 6360000002 HC RX W HCPCS

## 2022-04-05 RX ORDER — SODIUM CHLORIDE 9 MG/ML
INJECTION, SOLUTION INTRAVENOUS CONTINUOUS
Status: DISCONTINUED | OUTPATIENT
Start: 2022-04-05 | End: 2022-04-05 | Stop reason: HOSPADM

## 2022-04-05 RX ORDER — SODIUM CHLORIDE 0.9 % (FLUSH) 0.9 %
5-40 SYRINGE (ML) INJECTION PRN
Status: DISCONTINUED | OUTPATIENT
Start: 2022-04-05 | End: 2022-04-05 | Stop reason: HOSPADM

## 2022-04-05 RX ORDER — PROPOFOL 10 MG/ML
INJECTION, EMULSION INTRAVENOUS PRN
Status: DISCONTINUED | OUTPATIENT
Start: 2022-04-05 | End: 2022-04-05 | Stop reason: SDUPTHER

## 2022-04-05 RX ORDER — SODIUM CHLORIDE 0.9 % (FLUSH) 0.9 %
5-40 SYRINGE (ML) INJECTION EVERY 12 HOURS SCHEDULED
Status: DISCONTINUED | OUTPATIENT
Start: 2022-04-05 | End: 2022-04-05 | Stop reason: HOSPADM

## 2022-04-05 RX ORDER — SODIUM CHLORIDE 9 MG/ML
25 INJECTION, SOLUTION INTRAVENOUS PRN
Status: DISCONTINUED | OUTPATIENT
Start: 2022-04-05 | End: 2022-04-05 | Stop reason: HOSPADM

## 2022-04-05 RX ADMIN — SODIUM CHLORIDE: 9 INJECTION, SOLUTION INTRAVENOUS at 09:02

## 2022-04-05 RX ADMIN — PROPOFOL 200 MG: 10 INJECTION, EMULSION INTRAVENOUS at 09:02

## 2022-04-05 ASSESSMENT — PAIN - FUNCTIONAL ASSESSMENT: PAIN_FUNCTIONAL_ASSESSMENT: 0-10

## 2022-04-05 NOTE — PROGRESS NOTES
Went over discharge instructions with patient and significant other. Both verbalized understanding of instructions. Doctor came and spoke with patient before discharge.

## 2022-04-05 NOTE — ANESTHESIA POSTPROCEDURE EVALUATION
Department of Anesthesiology  Postprocedure Note    Patient: Julisa Escobedo  MRN: [id number]  YOB: 1935  Date of evaluation: 4/5/2022  Time:  9:19 AM     Procedure Summary     Date: 04/05/22 Room / Location: [facility] / [facility]    Anesthesia Start: 0900 Anesthesia Stop: 3491    Procedure: EGD ESOPHAGOGASTRODUODENOSCOPY WITH  DILATION (N/A ) Diagnosis: (STRICTURE)    Surgeons: Bryce Castro MD Responsible Provider: Pb Thomas DO    Anesthesia Type: MAC ASA Status: 3          Anesthesia Type: MAC    Brown Phase I: Brown Score: 10    Brown Phase II:      Last vitals: Reviewed and per EMR flowsheets. Anesthesia Post Evaluation    Patient location during evaluation: PACU  Patient participation: complete - patient participated  Level of consciousness: awake  Pain score: 0  Airway patency: patent  Nausea & Vomiting: no nausea and no vomiting  Complications: no  Cardiovascular status: blood pressure returned to baseline and hemodynamically stable  Respiratory status: acceptable  Hydration status: euvolemic  Comments: Seen and examined. Progressing well. No questions or concerns.

## 2022-04-05 NOTE — INTERVAL H&P NOTE
Update History & Physical    The patient's History and Physical of March 312022  was reviewed with the patient and I examined the patient. There was no change. The surgical site was confirmed by the patient and me. Plan: The risks, benefits, expected outcome, and alternative to the recommended procedure have been discussed with the patient. Patient understands and wants to proceed with the procedure.      Electronically signed by Bang Castano MD on 4/5/2022 at 9:21 AM

## 2022-04-05 NOTE — BRIEF OP NOTE
Brief Postoperative Note      Patient: Liana Alberto  YOB: 1935  MRN: [id number]    Date of Procedure: 4/5/2022    Pre-Op Diagnosis: STRICTURE    Post-Op Diagnosis: dilated 10 thru 15       Procedure(s):  EGD ESOPHAGOGASTRODUODENOSCOPY WITH  DILATION    Surgeon(s):  Sharon Macnilla MD    Assistant:  * No surgical staff found *    Anesthesia: Monitor Anesthesia Care    Estimated Blood Loss (mL): Minimal    Complications: None Immediate    Specimens:   * No specimens in log *    Implants:  * No implants in log *      Drains: * No LDAs found *    Findings: stricture w/o neoplasm or hernia    Electronically signed by Sharon Mancilla MD on 4/5/2022 at 9:24 AM

## 2022-04-06 NOTE — OP NOTE
[address] [place] [address]                                OPERATIVE REPORT    PATIENT NAME: Sean Molina                   :        1935  MED REC NO:   [id number]                            ROOM:  ACCOUNT NO:   [de-identified]                           ADMIT DATE: 2022  PROVIDER:     Emiliano Salcido MD    DATE OF PROCEDURE:  2022    PROCEDURE PERFORMED:  Esophagogastroduodenoscopy plus esophageal  dilatation. PREOPERATIVE DIAGNOSIS:  Esophageal stricture. POSTOPERATIVE DIAGNOSES:  1. Esophageal stricture, dilated to 15 mm. 2.  Normal stomach. 3.  Duodenum was not re-examined. INDICATIONS:  He is 82years of age with a recurrent stricture, which is  of indeterminate cause. Location would suggest peptic stricture, but he  has never had identified esophagitis, hiatal hernia, and certainly no  Bui's. He was evaluated endoscopically for a tight narrowing  several weeks ago and dilated up to 12 mm. He reports improvement, but  now is being brought back to dilate him further at least to 15 mm. Preop is monitored anesthesia care. DESCRIPTION OF PROCEDURE:  He was placed left lateral.  A bite block was  in place. Sedation was administered. The head of the bed was at 30  degrees. The Olympus GIF-H190 video endoscope was gently inserted into  the cervical esophagus and guided through a normal proximal segment of  the gastroesophageal junction, where once again there was a symmetric  stricture, which was not a ring, through which the endoscope that easily  passed two weeks ago no longer did. There was a small dissolving  tablet at this level. This was washed and cleared. TTS balloons  beginning at 10 mm were inflated and then increasing to 12 mm. When the  balloon was deflated, the instrument passed easily into the stomach.    Fundus and cardia were normal.  Body and antrum were normal. Since the  duodenum had recently been examined, that was not re-explored. The  endoscope was withdrawn to the GE junction and balloons measuring 13.5  and 15 were inflated. After each inflation to this level, the balloon  was deflated to inspect for mucosal disruption. At 15 mm, there was a  2-cm mucosal disruption with no characteristics suggesting of a deep  tear. However, it was decided to stop here. Terminated and well  Tolerated. EBL: 0    IMPRESSION:  Recurrent stricture. Dilated to 15 mm, where he did well  for a year last time and hopefully the results would be the same.         Prabhakar Willis MD    D: 04/05/2022 10:27:56       T: 04/05/2022 10:32:45     [id number]  Job#: [id number]     Doc#: [id number]    CC:  Erica Laird MD

## 2022-05-06 ENCOUNTER — OFFICE VISIT (OUTPATIENT)
Dept: CARDIOLOGY CLINIC | Age: 87
End: 2022-05-06
Payer: MEDICARE

## 2022-05-06 VITALS
SYSTOLIC BLOOD PRESSURE: 104 MMHG | WEIGHT: 199 LBS | HEIGHT: 70 IN | BODY MASS INDEX: 28.49 KG/M2 | DIASTOLIC BLOOD PRESSURE: 60 MMHG | HEART RATE: 64 BPM | RESPIRATION RATE: 16 BRPM

## 2022-05-06 DIAGNOSIS — I25.10 CORONARY ARTERY DISEASE INVOLVING NATIVE CORONARY ARTERY OF NATIVE HEART WITHOUT ANGINA PECTORIS: Primary | ICD-10-CM

## 2022-05-06 PROCEDURE — 93000 ELECTROCARDIOGRAM COMPLETE: CPT | Performed by: INTERNAL MEDICINE

## 2022-05-06 PROCEDURE — 99213 OFFICE O/P EST LOW 20 MIN: CPT | Performed by: INTERNAL MEDICINE

## 2022-05-06 RX ORDER — TAMSULOSIN HYDROCHLORIDE 0.4 MG/1
0.4 CAPSULE ORAL DAILY
COMMUNITY
Start: 2022-04-11

## 2022-05-06 RX ORDER — ACETAMINOPHEN,DIPHENHYDRAMINE HCL 500; 25 MG/1; MG/1
1 TABLET, FILM COATED ORAL NIGHTLY PRN
COMMUNITY

## 2022-05-06 NOTE — PROGRESS NOTES
OUTPATIENT CARDIOLOGY FOLLOW-UP    Name: John Contreras    Age: 82 y.o. Primary Care Physician: Jorge Ramirez MD    Date of Service: 5/6/2022    Chief Complaint: Follow-up for CAD, PVC's    Interim History:  He was previously followed by Dr. Everett Bailon; he established care with me in 9/2019. No new cardiac complaints since last cardiology evaluation. He denies recent chest pain, SOB, palpitations, lightheadedness, dizziness, syncope, PND, or orthopnea. He enjoys golfing (3-4 times/week; he used to golf with my grandpa). He is a retired damon. SR on EKG.     Review of Systems:   Cardiac: As per HPI  General: No fever, chills  Pulmonary: As per HPI  HEENT: No visual disturbances, difficult swallowing  GI: No nausea, vomiting  : No dysuria, hematuria  Endocrine: No thyroid disease, +DM  Musculoskeletal: ARTEAGA x 4, no focal motor deficits  Skin: Intact, no rashes  Neuro: No headache, seizures  Psych: Currently with no depression, anxiety    Past Medical History:  Past Medical History:   Diagnosis Date    CAD (coronary artery disease)     Dr. Carmina Reyes - 12-10-20     Difficulty swallowing     for EGD 2-16-21    Hyperlipidemia     Hypertension     stable per patient    On aspirin at home     presribed by Dr. Erika Schuler    Osteoarthritis     left knee    Type II or unspecified type diabetes mellitus without mention of complication, not stated as uncontrolled     since lost weight was taken off sugar pill     Past Surgical History:  Past Surgical History:   Procedure Laterality Date    COLONOSCOPY  6/16/2015    CORONARY ARTERY BYPASS GRAFT  2004    HERNIA REPAIR  1950's    inguinal    JOINT REPLACEMENT Left 2/22/2013    left total knee arthroplasty    TONSILLECTOMY      UPPER GASTROINTESTINAL ENDOSCOPY N/A 1/19/2021    EGD DILATION BALLOON performed by Radha Car MD at [facility] N/A 2/16/2021    EGD DILATION BALLOON performed by Radha Car, MD at [facility] N/A 3/22/2022    EGD DILATION BALLOON performed by Miranda Suarez MD at [facility] N/A 2022    EGD ESOPHAGOGASTRODUODENOSCOPY WITH  DILATION performed by Miranda Suarez MD at [facility] ENDOSCOPY     Family History:  Family History   Problem Relation Age of Onset    Heart Disease Mother     Cancer Father      Social History:  Social History     Socioeconomic History    Marital status:      Spouse name: Not on file    Number of children: Not on file    Years of education: Not on file    Highest education level: Not on file   Occupational History    Not on file   Tobacco Use    Smoking status: Former Smoker     Packs/day: 1.50     Years: 25.00     Pack years: 37.50     Types: Cigarettes     Quit date: 1983     Years since quittin.3    Smokeless tobacco: Never Used   Substance and Sexual Activity    Alcohol use: Yes     Alcohol/week: 2.0 standard drinks     Types: 2 Cans of beer per week     Comment: occasionally 2 drinks    Drug use: No    Sexual activity: Not on file   Other Topics Concern    Not on file   Social History Narrative    Not on file     Social Determinants of Health     Financial Resource Strain:     Difficulty of Paying Living Expenses: Not on file   Food Insecurity:     Worried About Running Out of Food in the Last Year: Not on file    Lissy of Food in the Last Year: Not on file   Transportation Needs:     Lack of Transportation (Medical): Not on file    Lack of Transportation (Non-Medical):  Not on file   Physical Activity:     Days of Exercise per Week: Not on file    Minutes of Exercise per Session: Not on file   Stress:     Feeling of Stress : Not on file   Social Connections:     Frequency of Communication with Friends and Family: Not on file    Frequency of Social Gatherings with Friends and Family: Not on file    Attends Gnosticism Services: Not on file  Active Member of Clubs or Organizations: Not on file    Attends Club or Organization Meetings: Not on file    Marital Status: Not on file   Intimate Partner Violence:     Fear of Current or Ex-Partner: Not on file    Emotionally Abused: Not on file    Physically Abused: Not on file    Sexually Abused: Not on file   Housing Stability:     Unable to Pay for Housing in the Last Year: Not on file    Number of [place] in the Last Year: Not on file    Unstable Housing in the Last Year: Not on file     Allergies:  No Known Allergies    Current Medications:  Current Outpatient Medications   Medication Sig Dispense Refill    diphenhydrAMINE-APAP, sleep, (TYLENOL PM EXTRA STRENGTH)  MG tablet Take 1 tablet by mouth nightly as needed for Sleep      tamsulosin (FLOMAX) 0.4 MG capsule 0.4 mg daily       amLODIPine (NORVASC) 5 MG tablet Take 1 tablet by mouth daily 90 tablet 1    metFORMIN (GLUCOPHAGE) 500 MG tablet Take 2 tablets by mouth 2 times daily (with meals) 360 tablet 1    metoprolol succinate (TOPROL XL) 50 MG extended release tablet Take 1 tablet by mouth daily 90 tablet 1    rosuvastatin (CRESTOR) 5 MG tablet Take 1 tablet by mouth daily (Patient taking differently: Take 5 mg by mouth Indications: taking 5 days a week ) 90 tablet 1    Handicap Placard MISC by Does not apply route Valid from 9/26/16 to 9/26/21  Dx osteoarthritis 2 each 0    acetaminophen (TYLENOL) 500 MG tablet   Take 1,000 mg by mouth daily Instructed to take morning of surgery with a sip of water if needs      multivitamin (THERAGRAN) per tablet   Take 1 tablet by mouth daily       aspirin 81 MG tablet   Take 81 mg by mouth daily Last dose  to check with Dr. Miracle Monroy if needs to hold       No current facility-administered medications for this visit.        Physical Exam:  /60   Pulse 64   Resp 16   Ht 5' 10\" (1.778 m)   Wt 199 lb (90.3 kg)   BMI 28.55 kg/m²   Wt Readings from Last 3 Encounters:   05/06/22 199 lb (90.3 kg)   04/05/22 196 lb (88.9 kg)   03/22/22 196 lb (88.9 kg)     Appearance: Awake, alert and oriented x 3, no acute respiratory distress  Skin: Intact, no rash  Head: Normocephalic, atraumatic  Eyes: EOMI, no conjunctival erythema  ENMT: No pharyngeal erythema, MMM, no rhinorrhea  Neck: Supple, no carotid bruits  Lungs: Clear to auscultation bilaterally. No wheezes, rales, or rhonchi. Cardiac: Regular rate and rhythm, 1/6 systolic murmur  Abdomen: Soft, nontender, +bowel sounds  Extremities: Moves all extremities x 4, no lower extremity edema  Neurologic: No focal motor deficits apparent, normal mood and affect, alert and oriented x 3    Laboratory Tests:  Lab Results   Component Value Date    CREATININE 1.0 03/28/2013    BUN 24 (H) 03/28/2013     03/28/2013    K 4.8 03/28/2013     (H) 03/28/2013    CO2 25 03/28/2013     Lab Results   Component Value Date    WBC 8.8 03/26/2013    HGB 11.9 (L) 03/26/2013    HCT 36.8 (L) 03/26/2013    MCV 95.5 03/26/2013     03/26/2013     Lab Results   Component Value Date    ALT 21 03/25/2013    AST 18 03/25/2013    ALKPHOS 67 03/25/2013    BILITOT 0.5 03/25/2013     Lab Results   Component Value Date    INR 1.6 03/25/2013    INR 2.1 02/25/2013    INR 2.3 02/24/2013    PROTIME 15.1 (H) 03/25/2013    PROTIME 17.5 (H) 02/25/2013    PROTIME 18.4 (H) 02/24/2013     Lab Results   Component Value Date    LABA1C 6.2 05/11/2020     Cardiac Tests:  ECG: SR, rate 64, NSSTT changes    Echocardiogram: 8/4/17 (Dr. Cinthia Chatterjee)   Normal left ventricular ejection fraction.   Ejection fraction is visually estimated at 55-65%. Normal right ventricular size and function.   Indeterminate diastolic function   The left atrium is mildly dilated.   Mild centrally directed mitral regurgitation   The aortic valve appears mildly sclerotic.   RVSP is 40 mmHg.   Pulmonary hypertension is mild .   No pericardial effusion. Exercise nuclear stress test:  5/10/17 (Dr. Sharyle King)  1. Exercise EKG was negative. 2. The patient experienced no chest pain with exercise. 3. The myocardial perfusion imaging was normal.    4. Overall left ventricular systolic function was normal without regional wall motion abnormalities. 5. Suggs treadmill score was 4.5.    6. Exercise capacity was below average.    7. Low risk exercise treadmill test.  8. Gated SPECT left ventricular ejection fraction was calculated to be 63%, with normal myocardial thickening and wall motion. ASSESSMENT / PLAN:  1. CAD s/p CABG in 12/2004 (LIMA-LAD, SVG-OM, SVG-RCA) -- negative stress test in 5/2017  2. HTN -- BP today 132/72, BP at last office visit 132/74, on norvasc and metoprolol  3. History of PVC's -- on metoprolol, normal EF  4. DM -- Hgb A1c 6.3 --> 6.2  5. HLD -- on statin  6.  Remote tobacco abuse    - Prior cardiac studies reviewed  - Continue current medications (including ASA, statin, and metoprolol)  - Aggressive risk factor modifications     Radha Thurston MD  [facility]) Cardiology

## 2023-01-12 ENCOUNTER — PREP FOR PROCEDURE (OUTPATIENT)
Dept: GASTROENTEROLOGY | Age: 88
End: 2023-01-12

## 2023-01-12 RX ORDER — SODIUM CHLORIDE 0.9 % (FLUSH) 0.9 %
5-40 SYRINGE (ML) INJECTION EVERY 12 HOURS SCHEDULED
Status: CANCELLED | OUTPATIENT
Start: 2023-01-12

## 2023-01-12 RX ORDER — SODIUM CHLORIDE 9 MG/ML
INJECTION, SOLUTION INTRAVENOUS PRN
Status: CANCELLED | OUTPATIENT
Start: 2023-01-12

## 2023-01-12 RX ORDER — SODIUM CHLORIDE 9 MG/ML
INJECTION, SOLUTION INTRAVENOUS CONTINUOUS
Status: CANCELLED | OUTPATIENT
Start: 2023-01-12

## 2023-01-12 RX ORDER — SODIUM CHLORIDE 0.9 % (FLUSH) 0.9 %
5-40 SYRINGE (ML) INJECTION PRN
Status: CANCELLED | OUTPATIENT
Start: 2023-01-12

## 2023-01-12 NOTE — H&P (VIEW-ONLY)
PATIENT NAME: David Mcnair NUMBER: [de-identified]    YOB: 1935    DATE: 01/10/2023    REFERRING PHYSICIAN:  Elicia Montalvo M.D. PROBLEM:  Dysphagia, known stricture. SUBJECTIVE:  He is now 82years of age. He was last seen under similar circumstances in February a year ago. He thought it longer. An endoscopic evaluation was completed again for a tight distal esophageal narrowing. The photographs would suggest the presence of a ring, but the area involved certainly seemed longer in length. There has never been evidence of peptic esophagitis and he was evaluated remotely through the office of Dr. Michael Day in 2008. At that time, the distal esophageal narrowing or stricture was identified and dilated by [facility] dilators. He was evaluated under similar circumstances for the first time through this office in 2013. He also had an incidental duodenal ulcer at that time without ever having documented recurrence. His last endoscopy was a year ago. Again, he thought it was longer when asked when his symptoms began to reemerge, he really was unable to say so. Medications may cause a problem. None of them seem particularly large, though he does take a multivitamin. He suggests he had problems with a doughnut the other day. His weight is unchanged from December 2020. He was evaluated by Urology last September for hematuria. That evaluation included at least a CT scan of the abdomen. His laboratory work in February of this year revealed a hemoglobin of 14.5 and hematocrit of 43. His creatinine was 1.24. His BUN was 18 and his liver function studies were normal.    PAST MEDICAL HISTORY:  Includes coronary artery bypass grafting in 2004, several colonoscopies and several endoscopic evaluations with esophageal dilatation, knee replacement, hypertension, and hyperlipidemia.     MEDICATIONS:  Metoprolol, amlodipine, metformin, Crestor, multivitamin, and 81 mg of aspirin. ALLERGIES:  None known. OBJECTIVE:  Height 5 feet 10 inches tall, weight 198 pounds, blood pressure 128/70. He is alert and oriented. He lacks pallor and scleral icterus. There is no neck vein elevation or adenopathy. Lungs are clear. Heart tones are normal.  Regular rate. Regular rhythm. No audible murmur. No gallop. The abdomen is soft and nontender. There is no hepatic or splenic enlargement. No ascites or dependent edema. ASSESSMENT:  He has symptomatic stricture. He responded at least for reasonable length of time with two esophageal dilatations. Comorbidities of hypertension, diabetes and heart disease seems stable. Reviewing his recent encounter with Dr. Harmony Coello on 02/28/2022, there was no mention of the issues of dysphagia at that time. He has maintained his weight. In fact, the note specifically says no issues with swallowing. PLAN:  Expeditious endoscopic evaluation with dilatation.

## 2023-01-12 NOTE — H&P
PATIENT NAME: Anais Fernandez NUMBER: [de-identified]    YOB: 1935    DATE: 01/10/2023    REFERRING PHYSICIAN:  Phil Mckeon M.D. PROBLEM:  Dysphagia, known stricture. SUBJECTIVE:  He is now 82years of age. He was last seen under similar circumstances in February a year ago. He thought it longer. An endoscopic evaluation was completed again for a tight distal esophageal narrowing. The photographs would suggest the presence of a ring, but the area involved certainly seemed longer in length. There has never been evidence of peptic esophagitis and he was evaluated remotely through the office of Dr. Nataly Magana in 2008. At that time, the distal esophageal narrowing or stricture was identified and dilated by [facility] dilators. He was evaluated under similar circumstances for the first time through this office in 2013. He also had an incidental duodenal ulcer at that time without ever having documented recurrence. His last endoscopy was a year ago. Again, he thought it was longer when asked when his symptoms began to reemerge, he really was unable to say so. Medications may cause a problem. None of them seem particularly large, though he does take a multivitamin. He suggests he had problems with a doughnut the other day. His weight is unchanged from December 2020. He was evaluated by Urology last September for hematuria. That evaluation included at least a CT scan of the abdomen. His laboratory work in February of this year revealed a hemoglobin of 14.5 and hematocrit of 43. His creatinine was 1.24. His BUN was 18 and his liver function studies were normal.    PAST MEDICAL HISTORY:  Includes coronary artery bypass grafting in 2004, several colonoscopies and several endoscopic evaluations with esophageal dilatation, knee replacement, hypertension, and hyperlipidemia.     MEDICATIONS:  Metoprolol, amlodipine, metformin, Crestor, multivitamin, and 81 mg of aspirin. ALLERGIES:  None known. OBJECTIVE:  Height 5 feet 10 inches tall, weight 198 pounds, blood pressure 128/70. He is alert and oriented. He lacks pallor and scleral icterus. There is no neck vein elevation or adenopathy. Lungs are clear. Heart tones are normal.  Regular rate. Regular rhythm. No audible murmur. No gallop. The abdomen is soft and nontender. There is no hepatic or splenic enlargement. No ascites or dependent edema. ASSESSMENT:  He has symptomatic stricture. He responded at least for reasonable length of time with two esophageal dilatations. Comorbidities of hypertension, diabetes and heart disease seems stable. Reviewing his recent encounter with Dr. Amparo Delong on 02/28/2022, there was no mention of the issues of dysphagia at that time. He has maintained his weight. In fact, the note specifically says no issues with swallowing. PLAN:  Expeditious endoscopic evaluation with dilatation.

## 2023-01-30 ENCOUNTER — ANESTHESIA EVENT (OUTPATIENT)
Dept: ENDOSCOPY | Age: 88
End: 2023-01-30
Payer: MEDICARE

## 2023-01-30 ASSESSMENT — LIFESTYLE VARIABLES: SMOKING_STATUS: 0

## 2023-01-30 NOTE — PROGRESS NOTES
[facility] PRE-ADMISSION TESTING INSTRUCTIONS    The Preadmission Testing patient is instructed accordingly using the following criteria (check applicable):    ARRIVAL INSTRUCTIONS:  [x] Parking the day of Surgery is located in the Main Entrance lot. Upon entering the door, make an immediate right to the surgery reception desk    [x] Bring photo ID and insurance card    [x] Bring in a copy of Living will or Durable Power of  papers. [x] Please be sure to arrange for responsible adult to provide transportation to and from the hospital    [x] Please arrange for responsible adult to be with you for the 24 hour period post procedure due to having anesthesia    [x] If you awake am of surgery not feeling well or have temperature >100 please call [phone]    GENERAL INSTRUCTIONS:    [x] Nothing by mouth after midnight, including gum, candy, mints or water    [x] You may brush your teeth, but do not swallow any water    [x] Take medications as instructed with 1-2 oz of water    [x] Stop herbal supplements and vitamins 5 days prior to procedure    [x] Follow preop dosing of blood thinners per physician instructions    [] Take 1/2 dose of evening insulin, but no insulin after midnight    [x] No oral diabetic medications after midnight    [x] If diabetic and have low blood sugar or feel symptomatic, take 1-2oz apple juice only    [] Bring inhalers day of surgery    [] Bring C-PAP/ Bi-Pap day of surgery    [] Bring urine specimen day of surgery    [x] Shower or bath with soap, lather and rinse well, AM of Surgery, no lotion, powders or creams to surgical site    [] Follow bowel prep as instructed per surgeon    [] No tobacco products within 24 hours of surgery     [x] No alcohol or illegal drug use within 24 hours of surgery.     [x] Jewelry, body piercing's, eyeglasses, contact lenses and dentures are not permitted into surgery (bring cases)      [] Please do not wear any nail polish, make up or hair products on the day of surgery    [x] You can expect a call the business day prior to procedure to notify you if your arrival time changes    [x] If you receive a survey after surgery we would greatly appreciate your comments    [] Parent/guardian of a minor must accompany their child and remain on the premises  the entire time they are under our care     [] Pediatric patients may bring favorite toy, blanket or comfort item with them    [] A caregiver or family member must remain with the patient during their stay if they are mentally handicapped, have dementia, disoriented or unable to use a call light or would be a safety concern if left unattended    [x] Please notify surgeon if you develop any illness between now and time of surgery (cold, cough, sore throat, fever, nausea, vomiting) or any signs of infections  including skin, wounds, and dental.    [x]  The Outpatient Pharmacy is available to fill your prescription here on your day of surgery, ask your preop nurse for details    [] Other instructions    EDUCATIONAL MATERIALS PROVIDED:    [] PAT Preoperative Education Packet/Booklet     [] Medication List    [] Transfusion bracelet applied with instructions    [] Shower with soap, lather and rinse well, and use CHG wipes provided the evening before surgery as instructed    [] Incentive spirometer with instructions

## 2023-01-31 ENCOUNTER — ANESTHESIA (OUTPATIENT)
Dept: ENDOSCOPY | Age: 88
End: 2023-01-31
Payer: MEDICARE

## 2023-01-31 ENCOUNTER — HOSPITAL ENCOUNTER (OUTPATIENT)
Age: 88
Setting detail: OUTPATIENT SURGERY
Discharge: HOME | End: 2023-01-31
Attending: INTERNAL MEDICINE | Admitting: INTERNAL MEDICINE
Payer: MEDICARE

## 2023-01-31 VITALS
SYSTOLIC BLOOD PRESSURE: 126 MMHG | WEIGHT: 188 LBS | DIASTOLIC BLOOD PRESSURE: 62 MMHG | OXYGEN SATURATION: 94 % | TEMPERATURE: 98 F | HEART RATE: 65 BPM | BODY MASS INDEX: 26.92 KG/M2 | HEIGHT: 70 IN | RESPIRATION RATE: 16 BRPM

## 2023-01-31 LAB — METER GLUCOSE: 118 MG/DL (ref 74–99)

## 2023-01-31 PROCEDURE — 3700000001 HC ADD 15 MINUTES (ANESTHESIA): Performed by: INTERNAL MEDICINE

## 2023-01-31 PROCEDURE — 2709999900 HC NON-CHARGEABLE SUPPLY: Performed by: INTERNAL MEDICINE

## 2023-01-31 PROCEDURE — 7100000011 HC PHASE II RECOVERY - ADDTL 15 MIN: Performed by: INTERNAL MEDICINE

## 2023-01-31 PROCEDURE — 82962 GLUCOSE BLOOD TEST: CPT

## 2023-01-31 PROCEDURE — 3609017700 HC EGD DILATION GASTRIC/DUODENAL STRICTURE: Performed by: INTERNAL MEDICINE

## 2023-01-31 PROCEDURE — 3700000000 HC ANESTHESIA ATTENDED CARE: Performed by: INTERNAL MEDICINE

## 2023-01-31 PROCEDURE — 2580000003 HC RX 258: Performed by: INTERNAL MEDICINE

## 2023-01-31 PROCEDURE — C1726 CATH, BAL DIL, NON-VASCULAR: HCPCS | Performed by: INTERNAL MEDICINE

## 2023-01-31 PROCEDURE — 6360000002 HC RX W HCPCS: Performed by: REGISTERED NURSE

## 2023-01-31 PROCEDURE — 7100000010 HC PHASE II RECOVERY - FIRST 15 MIN: Performed by: INTERNAL MEDICINE

## 2023-01-31 RX ORDER — SODIUM CHLORIDE 0.9 % (FLUSH) 0.9 %
5-40 SYRINGE (ML) INJECTION EVERY 12 HOURS SCHEDULED
Status: DISCONTINUED | OUTPATIENT
Start: 2023-01-31 | End: 2023-01-31 | Stop reason: HOSPADM

## 2023-01-31 RX ORDER — SODIUM CHLORIDE 9 MG/ML
INJECTION, SOLUTION INTRAVENOUS CONTINUOUS
Status: DISCONTINUED | OUTPATIENT
Start: 2023-01-31 | End: 2023-01-31 | Stop reason: HOSPADM

## 2023-01-31 RX ORDER — SODIUM CHLORIDE 0.9 % (FLUSH) 0.9 %
5-40 SYRINGE (ML) INJECTION PRN
Status: DISCONTINUED | OUTPATIENT
Start: 2023-01-31 | End: 2023-01-31 | Stop reason: HOSPADM

## 2023-01-31 RX ORDER — PROPOFOL 10 MG/ML
INJECTION, EMULSION INTRAVENOUS PRN
Status: DISCONTINUED | OUTPATIENT
Start: 2023-01-31 | End: 2023-01-31 | Stop reason: SDUPTHER

## 2023-01-31 RX ORDER — SODIUM CHLORIDE 9 MG/ML
INJECTION, SOLUTION INTRAVENOUS PRN
Status: DISCONTINUED | OUTPATIENT
Start: 2023-01-31 | End: 2023-01-31 | Stop reason: HOSPADM

## 2023-01-31 RX ADMIN — PROPOFOL 300 MG: 10 INJECTION, EMULSION INTRAVENOUS at 08:35

## 2023-01-31 RX ADMIN — SODIUM CHLORIDE: 9 INJECTION, SOLUTION INTRAVENOUS at 08:33

## 2023-01-31 ASSESSMENT — PAIN - FUNCTIONAL ASSESSMENT: PAIN_FUNCTIONAL_ASSESSMENT: 0-10

## 2023-01-31 ASSESSMENT — PAIN SCALES - GENERAL
PAINLEVEL_OUTOF10: 0
PAINLEVEL_OUTOF10: 0

## 2023-01-31 NOTE — ANESTHESIA PRE PROCEDURE
Department of Anesthesiology  Preprocedure Note       Name:  Katerina Gotti   Age:  82 y.o.  :  1935                                          MRN:  [id number]         Date:  2023      Surgeon: Arnoldo Abrams):  Manuel Olivo MD    Procedure: Procedure(s):  EGD ESOPHAGOGASTRODUODENOSCOPY DILATATION    Medications prior to admission:   Prior to Admission medications    Medication Sig Start Date End Date Taking?  Authorizing Provider   diphenhydrAMINE-APAP, sleep, (TYLENOL PM EXTRA STRENGTH)  MG tablet Take 1 tablet by mouth nightly as needed for Sleep    Historical Provider, MD   tamsulosin (FLOMAX) 0.4 MG capsule Take 0.4 mg by mouth daily 22   Historical Provider, MD   amLODIPine (NORVASC) 5 MG tablet Take 1 tablet by mouth daily 20   Fernando Hull MD   metFORMIN (GLUCOPHAGE) 500 MG tablet Take 2 tablets by mouth 2 times daily (with meals) 20   Fernando Hull MD   metoprolol succinate (TOPROL XL) 50 MG extended release tablet Take 1 tablet by mouth daily 20   Fernando Hull MD   rosuvastatin (CRESTOR) 5 MG tablet Take 1 tablet by mouth daily 19   Fernando Hull MD   Handicap Placard MISC by Does not apply route Valid from 16 to 21  Dx osteoarthritis 16   Fernando Hull MD   acetaminophen (TYLENOL) 500 MG tablet Take 1,000 mg by mouth daily    Historical Provider, MD   aspirin 81 MG tablet Take 81 mg by mouth daily    Historical Provider, MD       Current medications:    Current Outpatient Medications   Medication Sig Dispense Refill    diphenhydrAMINE-APAP, sleep, (TYLENOL PM EXTRA STRENGTH)  MG tablet Take 1 tablet by mouth nightly as needed for Sleep      tamsulosin (FLOMAX) 0.4 MG capsule Take 0.4 mg by mouth daily      amLODIPine (NORVASC) 5 MG tablet Take 1 tablet by mouth daily 90 tablet 1    metFORMIN (GLUCOPHAGE) 500 MG tablet Take 2 tablets by mouth 2 times daily (with meals) 360 tablet 1    metoprolol succinate (TOPROL XL) 50 MG extended release tablet Take 1 tablet by mouth daily 90 tablet 1    rosuvastatin (CRESTOR) 5 MG tablet Take 1 tablet by mouth daily 90 tablet 1    Handicap Placard MISC by Does not apply route Valid from 9/26/16 to 9/26/21  Dx osteoarthritis 2 each 0    acetaminophen (TYLENOL) 500 MG tablet Take 1,000 mg by mouth daily      aspirin 81 MG tablet Take 81 mg by mouth daily       No current facility-administered medications for this visit.        Allergies:  No Known Allergies    Problem List:    Patient Active Problem List   Diagnosis Code    Hyperlipidemia E78.5    Hypertension I10    Osteoarthritis M19.90    CAD (coronary artery disease) I25.10    Osteoarthritis of left knee M17.12    S/P CABG x 3 Z95.1    Type 2 diabetes mellitus without complication, without long-term current use of insulin ([facility]) E11.9       Past Medical History:        Diagnosis Date    CAD (coronary artery disease)     Dr. Jakob Contreras - 12-10-20     Difficulty swallowing     Hyperlipidemia     Hypertension     stable per patient    On aspirin at home     presribed by Dr. Tg Velazquez    Osteoarthritis     Type II or unspecified type diabetes mellitus without mention of complication, not stated as uncontrolled     since lost weight was taken off sugar pill       Past Surgical History:        Procedure Laterality Date    COLONOSCOPY  6/16/2015    CORONARY ARTERY BYPASS GRAFT  2004    HERNIA REPAIR  1950's    inguinal    JOINT REPLACEMENT Left 2/22/2013    left total knee arthroplasty    TONSILLECTOMY      UPPER GASTROINTESTINAL ENDOSCOPY N/A 1/19/2021    EGD DILATION BALLOON performed by Av Chase MD at [address] [place] [address] N/A 2/16/2021    EGD DILATION BALLOON performed by Av Chase MD at [address] [place] [address],[facility] N/A 3/22/2022    EGD DILATION BALLOON performed by Av Chase MD at [address] [place] [address] ENDOSCOPY N/A 2022    EGD ESOPHAGOGASTRODUODENOSCOPY WITH  DILATION performed by Charan Roa MD at [address] [place] [address] Crossing History:    Social History     Tobacco Use    Smoking status: Former     Packs/day: 1.50     Years: 25.00     Pack years: 37.50     Types: Cigarettes     Quit date: 1983     Years since quittin.1    Smokeless tobacco: Never   Substance Use Topics    Alcohol use: Yes     Alcohol/week: 2.0 standard drinks     Types: 2 Cans of beer per week     Comment: occasionally 2 drinks                                Counseling given: Not Answered      Vital Signs (Current): There were no vitals filed for this visit.                                            BP Readings from Last 3 Encounters:   22 104/60   22 (!) 112/58   22 119/60       NPO Status:                                                                                 BMI:   Wt Readings from Last 3 Encounters:   22 199 lb (90.3 kg)   22 196 lb (88.9 kg)   22 196 lb (88.9 kg)     There is no height or weight on file to calculate BMI.    CBC:   Lab Results   Component Value Date/Time    WBC 8.8 2013 04:10 AM    RBC 3.85 2013 04:10 AM    HGB 11.9 2013 04:10 AM    HCT 36.8 2013 04:10 AM    MCV 95.5 2013 04:10 AM    RDW 14.1 2013 04:10 AM     2013 04:10 AM       CMP:   Lab Results   Component Value Date/Time     2013 05:20 AM    K 4.8 2013 05:20 AM     2013 05:20 AM    CO2 25 2013 05:20 AM    BUN 24 2013 05:20 AM    CREATININE 1.0 2013 05:20 AM    LABGLOM >60 2013 07:37 AM    GLUCOSE 115 2020 09:26 AM    PROT 6.4 2013 11:01 AM    CALCIUM 8.3 2013 05:20 AM    BILITOT 0.5 2013 11:01 AM    ALKPHOS 67 2013 11:01 AM    AST 18 2013 11:01 AM    ALT 21 2013 11:01 AM       POC Tests: No results for input(s): POCGLU, POCNA, POCK, POCCL, POCBUN, POCHEMO, POCHCT in the last 72 hours. Coags:   Lab Results   Component Value Date/Time    PROTIME 15.1 2013 10:50 PM    INR 1.6 2013 10:50 PM       HCG (If Applicable): No results found for: PREGTESTUR, PREGSERUM, HCG, HCGQUANT     ABGs: No results found for: PHART, PO2ART, OLQ8BKO, GRU2TXM, BEART, U9FUUGIH     Type & Screen (If Applicable):  No results found for: LABABO, LABRH    Drug/Infectious Status (If Applicable):  No results found for: HIV, HEPCAB    COVID-19 Screening (If Applicable):   Lab Results   Component Value Date/Time    COVID19 Not Detected 2021 08:14 AM           Anesthesia Evaluation  Patient summary reviewed no history of anesthetic complications:   Airway: Mallampati: III  TM distance: <3 FB   Neck ROM: full  Mouth opening: > = 3 FB   Dental:          Pulmonary:normal exam  breath sounds clear to auscultation  (+) COPD:      (-) asthma, sleep apnea and not a current smoker                          ROS comment: Former - 37.5 pack years  Quit Smokin83       Cardiovascular:  Exercise tolerance: good (>4 METS),   (+) hypertension: moderate and no interval change, CAD: obstructive, CABG/stent (CABG x 3): no interval change, murmur (Grade II), pulmonary hypertension (PASP 40 mm Hg): mild, hyperlipidemia    (-) dysrhythmias,  angina and  CHF    ECG reviewed  Rhythm: regular  Rate: normal           Beta Blocker:  Dose within 24 Hrs      ROS comment: EKG:  Sinus  Bradycardia  - with rate variation   cv = 17  WITHIN NORMAL LIMITS    ECHO 2017:   Summary   Normal left ventricular ejection fraction. Ejection fraction is visually estimated at 55-65%. Indeterminate diastolic function   The left atrium is mildly dilated. Mild centrally directed mitral regurgitation   The aortic valve appears mildly sclerotic. Pulmonary hypertension is mild . No pericardial effusion.      Neuro/Psych:   Negative Neuro/Psych ROS     (-) seizures, TIA and CVA           GI/Hepatic/Renal:        (-) hepatitis      ROS comment: Dysphagia. Endo/Other:    (+) Diabetes (Currently diet controlled)Type II DM, well controlled, , : arthritis: OA., .    (-) blood dyscrasia        Pt had no PAT visit       Abdominal:         (-) obese       Vascular: negative vascular ROS. - DVT and PE. Other Findings:             Anesthesia Plan      MAC     ASA 3       Induction: intravenous. Anesthetic plan and risks discussed with patient. Plan discussed with CRNA.                     Juan Everett MD   4-11-30

## 2023-01-31 NOTE — DISCHARGE INSTRUCTIONS
Follow all instructions carefully:      Restrictions: Do not drive or operate machinery for eighteen hours after sedation. Diet:  Resume soft diet       Medications: [unfilled]      Follow up Care: Follow- we should repeat this in two weeks    If problems with abdominal pain, nausea, vomiting, bleeding or any other concerns call Dr. Danelle Dimas at [phone] or Answering Service [phone], If unable to reach me call [facility].     Melina Naqvi MD M.D.

## 2023-01-31 NOTE — BRIEF OP NOTE
Brief Postoperative Note      Patient: Vicky Rosas  YOB: 1935  MRN: [id number]    Date of Procedure: 1/31/2023    Pre-Op Diagnosis: Esophageal stricture [K22.2]    Post-Op Diagnosis:  dilated 8 thru 11       Procedure(s):  EGD ESOPHAGOGASTRODUODENOSCOPY DILATATION    Surgeon(s):  Jinny Tejeda MD    Assistant:  * No surgical staff found *    Anesthesia: Monitor Anesthesia Care    Estimated Blood Loss (mL): Minimal    Complications: None imediate    Specimens:   * No specimens in log *    Implants:  * No implants in log *      Drains: * No LDAs found *    Findings: stricture tight    Electronically signed by Jinny Tejeda MD on 1/31/2023 at 8:54 AM

## 2023-01-31 NOTE — INTERVAL H&P NOTE
Update History & Physical    The patient's History and Physical of January 10, 2023 was reviewed with the patient and I examined the patient. There was no change. The surgical site was confirmed by the patient and me.     Plan: The risks, benefits, expected outcome, and alternative to the recommended procedure have been discussed with the patient. Patient understands and wants to proceed with the procedure.     Electronically signed by Gary Liu MD on 1/31/2023 at 8:31 AM

## 2023-01-31 NOTE — ANESTHESIA POSTPROCEDURE EVALUATION
Department of Anesthesiology  Postprocedure Note    Patient: Lorena Virk  MRN: [id number]  YOB: 1935  Date of evaluation: 1/31/2023      Procedure Summary     Date: 01/31/23 Room / Location: [facility] / [facility]    Anesthesia Start: 0659 Anesthesia Stop: 0012    Procedure: EGD DILATION BALLOON Diagnosis:       Esophageal stricture      (Esophageal stricture [K22.2])    Surgeons: Ashley Mitchell MD Responsible Provider: Dustin Fernandez MD    Anesthesia Type: MAC ASA Status: 3          Anesthesia Type: No value filed.     Brown Phase I: Brown Score: 10    Brown Phase II: Brown Score: 10      Anesthesia Post Evaluation    Patient location during evaluation: bedside  Patient participation: complete - patient participated  Level of consciousness: awake  Pain score: 0  Nausea & Vomiting: no nausea and no vomiting  Respiratory status: acceptable  Hydration status: euvolemic

## 2023-02-02 NOTE — OP NOTE
[address] [address]                                OPERATIVE REPORT    PATIENT NAME: Josseline Crawford                   :        1935  MED REC NO:   [id number]                            ROOM:  ACCOUNT NO:   [de-identified]                           ADMIT DATE: 2023  PROVIDER:     Angela Mcelroy MD    DATE OF PROCEDURE:  2023    PRIMARY CARE PROVIDER:  Scheryl Angelucci, MD.    PROCEDURE PERFORMED:  Esophagogastroduodenoscopy plus balloon  dilatation. PREOPERATIVE DIAGNOSIS:  Stricture, distal esophagus. POSTOPERATIVE DIAGNOSES:  1. Tight distal esophageal stricture without inflammatory changes,  dilated to 12 mm. 2.  Small hiatal hernia. 3.  Normal stomach. 4.  Duodenal stricture, second portion of the duodenum, chronic and  nonobstructive. INDICATIONS:  He is 82years of age with a history of recurrent distal  esophageal stricture, undergoing annual endoscopic evaluations for  dilatation. The diameter of the stricture probably is somewhere between  6 and 7 mm on average. Preop is monitored anesthesia care. DESCRIPTION OF PROCEDURE:  With he in the left lateral and a bite block  in place, the Olympus GIF-H190 video endoscope was guided into the  cervical esophagus, which was normal.  Distally, there was a tight  stricture through which again the endoscope would not pass without  active esophagitis. It was estimated to be about 6 to 7 mm in size. The balloon measuring 8 mm was inflated across and inflated to 8 mm,  then 9 mm, then 10 mm. Once 10 mm had been achieved, the endoscope was  passed into the stomach. The endoscope was used to evaluate the gastric  chamber, which was unremarkable other than the hiatal hernia and the  proximal duodenum again revealed a bland stricture in the second portion  of the duodenum, which was not traversed.   The endoscope was withdrawn  to the [address] [place] [address] & [facility] junction and balloon dilation with 11 and then 12 mm balloon  was inflated. Mucosal disruption was moderate and about a centimeter  and half in length. Procedure was terminated at this point. Estimated blood loss is zero. ASSESSMENT:  We will return in two weeks with a target of 15 mm.         Maria E Hancock MD    D: 02/01/2023 22:32:05       T: 02/01/2023 22:35:30     [email or web address]  Job#: [id number]     Doc#: [id number]    CC:

## 2023-02-03 RX ORDER — SODIUM CHLORIDE 0.9 % (FLUSH) 0.9 %
5-40 SYRINGE (ML) INJECTION PRN
Status: CANCELLED | OUTPATIENT
Start: 2023-02-03

## 2023-02-03 RX ORDER — SODIUM CHLORIDE 9 MG/ML
INJECTION, SOLUTION INTRAVENOUS PRN
Status: CANCELLED | OUTPATIENT
Start: 2023-02-03

## 2023-02-03 RX ORDER — SODIUM CHLORIDE 0.9 % (FLUSH) 0.9 %
5-40 SYRINGE (ML) INJECTION EVERY 12 HOURS SCHEDULED
Status: CANCELLED | OUTPATIENT
Start: 2023-02-03

## 2023-02-03 RX ORDER — SODIUM CHLORIDE 9 MG/ML
INJECTION, SOLUTION INTRAVENOUS CONTINUOUS
Status: CANCELLED | OUTPATIENT
Start: 2023-02-03

## 2023-02-03 NOTE — H&P
PATIENT NAME: Akil Giraldo NUMBER: [de-identified]    YOB: 1935    DATE: 01/23/2022    REFERRING PHYSICIAN:  Francia Spear M.D. PROBLEM:  Dysphagia, known stricture. SUBJECTIVE:  He is now 82years of age. He was last seen under similar circumstances in February a year ago. He thought it longer. An endoscopic evaluation was completed again for a tight distal esophageal narrowing. The photographs would suggest the presence of a ring, but the area involved certainly seemed longer in length. There has never been evidence of peptic esophagitis and he was evaluated remotely through the office of Dr. Gaviota Kc in 2008. At that time, the distal esophageal narrowing or stricture was identified and dilated by [facility] dilators. He was evaluated under similar circumstances for the first time through this office in 2013. He also had an incidental duodenal ulcer at that time without ever having documented recurrence. His last endoscopy was a year ago. Again, he thought it was longer when asked when his symptoms began to reemerge, he really was unable to say so. Medications may cause a problem. None of them seem particularly large, though he does take a multivitamin. He suggests he had problems with a doughnut the other day. His weight is unchanged from December 2020. He was evaluated by Urology last September for hematuria. That evaluation included at least a CT scan of the abdomen. His laboratory work in February of this year revealed a hemoglobin of 14.5 and hematocrit of 43. His creatinine was 1.24. His BUN was 18 and his liver function studies were normal.    PAST MEDICAL HISTORY:  Includes coronary artery bypass grafting in 2004, several colonoscopies and several endoscopic evaluations with esophageal dilatation, knee replacement, hypertension, and hyperlipidemia.     MEDICATIONS:  Metoprolol, amlodipine, metformin, Crestor, multivitamin, and 81 mg of aspirin. ALLERGIES:  None known. OBJECTIVE:  Height 5 feet 10 inches tall, weight 198 pounds, blood pressure 128/70. He is alert and oriented. He lacks pallor and scleral icterus. There is no neck vein elevation or adenopathy. Lungs are clear. Heart tones are normal.  Regular rate. Regular rhythm. No audible murmur. No gallop. The abdomen is soft and nontender. There is no hepatic or splenic enlargement. No ascites or dependent edema. ASSESSMENT:  He has symptomatic stricture. He responded at least for reasonable length of time with two esophageal dilatations. Comorbidities of hypertension, diabetes and heart disease seems stable. Reviewing his recent encounter with Dr. Chloé Mckenna on 02/28/2022, there was no mention of the issues of dysphagia at that time. He has maintained his weight. In fact, the note specifically says no issues with swallowing. PLAN:  Expeditious endoscopic evaluation with dilatation. He chooses [employer].

## 2023-02-08 NOTE — PROGRESS NOTES
[facility] PRE-ADMISSION TESTING INSTRUCTIONS      ARRIVAL INSTRUCTIONS:  [x] Parking the day of Surgery is located in the Main Entrance lot. Upon entering the main door make an immediate right to the surgery reception desk. [x] Bring photo ID and insurance card    [] Bring in a copy of Living will or Durable Power of  papers. [x] Please be sure to arrange for responsible adult to provide transportation to and from the hospital    [x] Please arrange for responsible adult to be with you for the 24 hour period post procedure due to having anesthesia    [x] If you awake am of surgery not feeling well or have temperature >100 please call [phone]    GENERAL INSTRUCTIONS:    [x] Nothing by mouth after midnight, including gum, candy, mints or water    [x] You may brush your teeth, but do not swallow any water    [x] Take medications as instructed with 1-2 oz of water    [x] Stop herbal supplements and vitamins 5 days prior to procedure    [x] Follow preop dosing of blood thinners per physician instructions    [] Take 1/2 dose of evening insulin, but no insulin after midnight    [x] No oral diabetic medications after midnight    [x] If diabetic and have low blood sugar or feel symptomatic, take 1-2oz apple juice only    [] Bring inhalers day of surgery    [] Bring C-PAP/ Bi-Pap day of surgery    [] Bring urine specimen day of surgery    [x] Shower or bath with soap, lather and rinse well, AM of Surgery, no lotion, powders or creams to surgical site    [] Follow bowel prep as instructed per surgeon    [x] No tobacco products within 24 hours of surgery     [x] No alcohol or illegal drug use within 24 hours of surgery.     [x] Jewelry, body piercing's, eyeglasses, contact lenses and dentures are not permitted into surgery (bring cases)      [x] Please do not wear any nail polish, make up or hair products on the day of surgery    [x] You can expect a call the business day prior to procedure to notify you if your arrival time changes    [x] If you receive a survey after surgery we would greatly appreciate your comments    [x] Please notify surgeon if you develop any illness between now and time of surgery (cold, cough, sore throat, fever, nausea, vomiting) or any signs of infections  including skin, wounds, and dental.    []  The [facility]patient [facility] is available to fill your prescription here on your day of surgery, ask your preop nurse for details

## 2023-02-14 ENCOUNTER — ANESTHESIA EVENT (OUTPATIENT)
Dept: ENDOSCOPY | Age: 88
End: 2023-02-14
Payer: MEDICARE

## 2023-02-14 ENCOUNTER — HOSPITAL ENCOUNTER (OUTPATIENT)
Age: 88
Setting detail: OUTPATIENT SURGERY
Discharge: HOME | End: 2023-02-14
Attending: INTERNAL MEDICINE | Admitting: INTERNAL MEDICINE
Payer: MEDICARE

## 2023-02-14 ENCOUNTER — ANESTHESIA (OUTPATIENT)
Dept: ENDOSCOPY | Age: 88
End: 2023-02-14
Payer: MEDICARE

## 2023-02-14 VITALS
BODY MASS INDEX: 26.92 KG/M2 | TEMPERATURE: 97 F | SYSTOLIC BLOOD PRESSURE: 145 MMHG | HEIGHT: 70 IN | DIASTOLIC BLOOD PRESSURE: 63 MMHG | HEART RATE: 57 BPM | RESPIRATION RATE: 18 BRPM | WEIGHT: 188 LBS | OXYGEN SATURATION: 97 %

## 2023-02-14 LAB — METER GLUCOSE: 105 MG/DL (ref 74–99)

## 2023-02-14 PROCEDURE — 3609017700 HC EGD DILATION GASTRIC/DUODENAL STRICTURE: Performed by: INTERNAL MEDICINE

## 2023-02-14 PROCEDURE — 3700000001 HC ADD 15 MINUTES (ANESTHESIA): Performed by: INTERNAL MEDICINE

## 2023-02-14 PROCEDURE — 2709999900 HC NON-CHARGEABLE SUPPLY: Performed by: INTERNAL MEDICINE

## 2023-02-14 PROCEDURE — 7100000010 HC PHASE II RECOVERY - FIRST 15 MIN: Performed by: INTERNAL MEDICINE

## 2023-02-14 PROCEDURE — 7100000011 HC PHASE II RECOVERY - ADDTL 15 MIN: Performed by: INTERNAL MEDICINE

## 2023-02-14 PROCEDURE — C1726 CATH, BAL DIL, NON-VASCULAR: HCPCS | Performed by: INTERNAL MEDICINE

## 2023-02-14 PROCEDURE — 3700000000 HC ANESTHESIA ATTENDED CARE: Performed by: INTERNAL MEDICINE

## 2023-02-14 PROCEDURE — 82962 GLUCOSE BLOOD TEST: CPT

## 2023-02-14 PROCEDURE — 6360000002 HC RX W HCPCS

## 2023-02-14 RX ORDER — SODIUM CHLORIDE 0.9 % (FLUSH) 0.9 %
5-40 SYRINGE (ML) INJECTION EVERY 12 HOURS SCHEDULED
Status: DISCONTINUED | OUTPATIENT
Start: 2023-02-14 | End: 2023-02-14 | Stop reason: HOSPADM

## 2023-02-14 RX ORDER — SODIUM CHLORIDE 9 MG/ML
INJECTION, SOLUTION INTRAVENOUS CONTINUOUS
Status: DISCONTINUED | OUTPATIENT
Start: 2023-02-14 | End: 2023-02-14 | Stop reason: HOSPADM

## 2023-02-14 RX ORDER — SODIUM CHLORIDE 9 MG/ML
INJECTION, SOLUTION INTRAVENOUS PRN
Status: DISCONTINUED | OUTPATIENT
Start: 2023-02-14 | End: 2023-02-14 | Stop reason: HOSPADM

## 2023-02-14 RX ORDER — SODIUM CHLORIDE 0.9 % (FLUSH) 0.9 %
5-40 SYRINGE (ML) INJECTION PRN
Status: DISCONTINUED | OUTPATIENT
Start: 2023-02-14 | End: 2023-02-14 | Stop reason: HOSPADM

## 2023-02-14 RX ORDER — PROPOFOL 10 MG/ML
INJECTION, EMULSION INTRAVENOUS PRN
Status: DISCONTINUED | OUTPATIENT
Start: 2023-02-14 | End: 2023-02-14 | Stop reason: SDUPTHER

## 2023-02-14 RX ADMIN — PROPOFOL 200 MG: 10 INJECTION, EMULSION INTRAVENOUS at 09:51

## 2023-02-14 ASSESSMENT — PAIN SCALES - GENERAL
PAINLEVEL_OUTOF10: 0

## 2023-02-14 ASSESSMENT — LIFESTYLE VARIABLES: SMOKING_STATUS: 0

## 2023-02-14 NOTE — BRIEF OP NOTE
Brief Postoperative Note      Patient: Jossie Kern  YOB: 1935  MRN: [id number]    Date of Procedure: 2/14/2023    Pre-Op Diagnosis: Esophageal stricture [K22.2]    Post-Op Diagnosis:  dilated 10 thru 12 w/o mucosal disruption then 13.5 and 15 mm       Procedure(s):  EGD ESOPHAGOGASTRODUODENOSCOPY DILATATION    Surgeon(s):  Ro Rayo MD    Assistant:  * No surgical staff found *    Anesthesia: Monitor Anesthesia Care    Estimated Blood Loss (mL): Minimal    Complications: None immediate    Specimens:   * No specimens in log *    Implants:  * No implants in log *      Drains: * No LDAs found *    Findings: stricture, improved over prior egd    Electronically signed by Ro Rayo MD on 2/14/2023 at 10:31 AM

## 2023-02-14 NOTE — DISCHARGE INSTRUCTIONS
Follow all instructions carefully:      Restrictions: Do not drive or operate machinery for eighteen hours after sedation. Diet:  Resume soft diet x 24 hrs       Medications: [unfilled]      Follow up Care: Follow-up as needed    If problems with abdominal pain, nausea, vomiting, bleeding or any other concerns call Dr. Ansley Gilman at [phone] or Answering Service [phone], If unable to reach me call [facility].     Erika Nelson MD M.D.

## 2023-02-14 NOTE — ANESTHESIA POSTPROCEDURE EVALUATION
Department of Anesthesiology  Postprocedure Note    Patient: Ean Ford  MRN: [id number]  YOB: 1935  Date of evaluation: 2/14/2023      Procedure Summary     Date: 02/14/23 Room / Location: [facility] / [facility]    Anesthesia Start: 4481 Anesthesia Stop: 1005    Procedure: EGD ESOPHAGOGASTRODUODENOSCOPY DILATATION Diagnosis:       Esophageal stricture      (Esophageal stricture [K22.2])    Surgeons: Danyelle Bailey MD Responsible Provider: Santy Barros MD    Anesthesia Type: MAC ASA Status: 3          Anesthesia Type: No value filed.     Brown Phase I: Brown Score: 10    Brown Phase II:        Anesthesia Post Evaluation    Patient location during evaluation: PACU  Patient participation: complete - patient participated  Level of consciousness: awake and alert  Pain score: 0  Airway patency: patent  Nausea & Vomiting: no vomiting and no nausea  Complications: no  Cardiovascular status: hemodynamically stable  Respiratory status: acceptable  Hydration status: stable

## 2023-02-14 NOTE — ANESTHESIA PRE PROCEDURE
Department of Anesthesiology  Preprocedure Note       Name:  Gaby Blanco   Age:  82 y.o.  :  1935                                          MRN:  [id number]         Date:  2023      Surgeon: Lonna Frankel):  Wang Lin MD    Procedure: Procedure(s):  EGD ESOPHAGOGASTRODUODENOSCOPY DILATATION    Medications prior to admission:   Prior to Admission medications    Medication Sig Start Date End Date Taking? Authorizing Provider   diphenhydrAMINE-APAP, sleep, (TYLENOL PM EXTRA STRENGTH)  MG tablet Take 1 tablet by mouth nightly as needed for Sleep    Historical Provider, MD   tamsulosin (FLOMAX) 0.4 MG capsule Take 0.4 mg by mouth daily 22   Historical Provider, MD   amLODIPine (NORVASC) 5 MG tablet Take 1 tablet by mouth daily 20   Nico Valentine MD   metFORMIN (GLUCOPHAGE) 500 MG tablet Take 2 tablets by mouth 2 times daily (with meals) 20   Glen Garcia MD   metoprolol succinate (TOPROL XL) 50 MG extended release tablet Take 1 tablet by mouth daily 20   Nico Valentine MD   rosuvastatin (CRESTOR) 5 MG tablet Take 1 tablet by mouth daily 19   Nico Valentine MD   Handicap Placard MISC by Does not apply route Valid from 16 to 21  Dx osteoarthritis 16   Glen Garcia MD   acetaminophen (TYLENOL) 500 MG tablet Take 1,000 mg by mouth daily    Historical Provider, MD   aspirin 81 MG tablet Take 81 mg by mouth daily    Historical Provider, MD       Current medications:    No current facility-administered medications for this visit. No current outpatient medications on file.      Facility-Administered Medications Ordered in Other Visits   Medication Dose Route Frequency Provider Last Rate Last Admin    0.9 % sodium chloride infusion   IntraVENous Continuous Wang Lin MD        sodium chloride flush 0.9 % injection 5-40 mL  5-40 mL IntraVENous 2 times per day Wang Lin MD        sodium chloride flush 0.9 % injection 5-40 mL  5-40 mL IntraVENous PRN Chica Altamirano MD        0.9 % sodium chloride infusion   IntraVENous PRN Chica Altamirano MD           Allergies:  No Known Allergies    Problem List:    Patient Active Problem List   Diagnosis Code    Hyperlipidemia E78.5    Hypertension I10    Osteoarthritis M19.90    CAD (coronary artery disease) I25.10    Osteoarthritis of left knee M17.12    S/P CABG x 3 Z95.1    Type 2 diabetes mellitus without complication, without long-term current use of insulin ([facility]) E11.9       Past Medical History:        Diagnosis Date    CAD (coronary artery disease)     Dr. Josette Peterson - 5/2022    Difficulty swallowing     Hyperlipidemia     Hypertension     stable per patient    On aspirin at home     presribed by Dr. Bianka Lopez    Osteoarthritis     Type II or unspecified type diabetes mellitus without mention of complication, not stated as uncontrolled        Past Surgical History:        Procedure Laterality Date    COLONOSCOPY  6/16/2015    CORONARY ARTERY BYPASS GRAFT  2004    HERNIA REPAIR  1950's    inguinal    JOINT REPLACEMENT Left 2/22/2013    left total knee arthroplasty    TONSILLECTOMY      UPPER GASTROINTESTINAL ENDOSCOPY N/A 1/19/2021    EGD DILATION BALLOON performed by Chica Altamirano MD at [address] [place] N/A 2/16/2021    EGD DILATION BALLOON performed by Chica Altamirano MD at [address] [place] N/A 3/22/2022    EGD DILATION BALLOON performed by Chica Altamirano MD at [address] [place] N/A 4/5/2022    EGD ESOPHAGOGASTRODUODENOSCOPY WITH  DILATION performed by Chica Altamirano MD at [address] [place] N/A 1/31/2023    EGD DILATION BALLOON performed by Chica Altamirano MD at [facility] ENDOSCOPY       Social History:    Social History     Tobacco Use    Smoking status: Former     Packs/day: 1.50     Years: 25.00 Pack years: 37.50     Types: Cigarettes     Quit date: 1983     Years since quittin.1    Smokeless tobacco: Never   Substance Use Topics    Alcohol use: Yes     Alcohol/week: 2.0 standard drinks     Types: 2 Cans of beer per week     Comment: occasionally 2 drinks                                Counseling given: Not Answered      Vital Signs (Current): There were no vitals filed for this visit. BP Readings from Last 3 Encounters:   23 131/65   23 126/62   22 104/60       NPO Status:                                                                                 BMI:   Wt Readings from Last 3 Encounters:   23 188 lb (85.3 kg)   23 188 lb (85.3 kg)   22 199 lb (90.3 kg)     There is no height or weight on file to calculate BMI.    CBC:   Lab Results   Component Value Date/Time    WBC 8.8 2013 04:10 AM    RBC 3.85 2013 04:10 AM    HGB 11.9 2013 04:10 AM    HCT 36.8 2013 04:10 AM    MCV 95.5 2013 04:10 AM    RDW 14.1 2013 04:10 AM     2013 04:10 AM       CMP:   Lab Results   Component Value Date/Time     2013 05:20 AM    K 4.8 2013 05:20 AM     2013 05:20 AM    CO2 25 2013 05:20 AM    BUN 24 2013 05:20 AM    CREATININE 1.0 2013 05:20 AM    LABGLOM >60 2013 07:37 AM    GLUCOSE 115 2020 09:26 AM    PROT 6.4 2013 11:01 AM    CALCIUM 8.3 2013 05:20 AM    BILITOT 0.5 2013 11:01 AM    ALKPHOS 67 2013 11:01 AM    AST 18 2013 11:01 AM    ALT 21 2013 11:01 AM       POC Tests: No results for input(s): POCGLU, POCNA, POCK, POCCL, POCBUN, POCHEMO, POCHCT in the last 72 hours.     Coags:   Lab Results   Component Value Date/Time    PROTIME 15.1 2013 10:50 PM    INR 1.6 2013 10:50 PM       HCG (If Applicable): No results found for: PREGTESTUR, PREGSERUM, HCG, HCGQUANT     ABGs: No results found for: PHART, PO2ART, EIN3VPP, LUO0GER, BEART, D4RZFXGG     Type & Screen (If Applicable):  No results found for: LABABO, LABRH    Drug/Infectious Status (If Applicable):  No results found for: HIV, HEPCAB    COVID-19 Screening (If Applicable):   Lab Results   Component Value Date/Time    COVID19 Not Detected 2021 08:14 AM           Anesthesia Evaluation  Patient summary reviewed no history of anesthetic complications:   Airway: Mallampati: III  TM distance: <3 FB   Neck ROM: full  Mouth opening: > = 3 FB   Dental:          Pulmonary:normal exam  breath sounds clear to auscultation  (+) COPD:      (-) not a current smoker                          ROS comment: Former - 37.5 pack years  Quit Smokin83       Cardiovascular:  Exercise tolerance: good (>4 METS),   (+) hypertension: moderate and no interval change, CAD: obstructive, CABG/stent (CABG x 3): no interval change, murmur (Grade II), pulmonary hypertension (PASP 40 mm Hg): mild, hyperlipidemia      ECG reviewed  Rhythm: regular  Rate: normal           Beta Blocker:  Dose within 24 Hrs      ROS comment: EKG:  Sinus  Bradycardia  - with rate variation   cv = 17  WITHIN NORMAL LIMITS    ECHO 2017:   Summary   Normal left ventricular ejection fraction. Ejection fraction is visually estimated at 55-65%. Indeterminate diastolic function   The left atrium is mildly dilated. Mild centrally directed mitral regurgitation   The aortic valve appears mildly sclerotic. Pulmonary hypertension is mild . No pericardial effusion. Neuro/Psych:   Negative Neuro/Psych ROS              GI/Hepatic/Renal:            ROS comment: Dysphagia. Endo/Other:    (+) Diabetes (Currently diet controlled)Type II DM, well controlled, , : arthritis: OA., . Pt had no PAT visit       Abdominal:             Vascular: negative vascular ROS. Other Findings:             Anesthesia Plan      MAC     ASA 3       Induction: intravenous.       Anesthetic plan and risks discussed with patient. Plan discussed with CRNA.                     Triston Stevens MD   2/14/23

## 2023-02-15 NOTE — OP NOTE
[address] [address]                                OPERATIVE REPORT    PATIENT NAME: Jesse Mathews                   :        1935  MED REC NO:   [id number]                            ROOM:  ACCOUNT NO:   [de-identified]                           ADMIT DATE: 2023  PROVIDER:     Hattie Cohen MD    DATE OF PROCEDURE:  2023    PROCEDURE PERFORMED:  Esophagogastroduodenoscopy plus esophageal  dilatation. PREOPERATIVE DIAGNOSIS:  Esophageal stricture. POSTOPERATIVE DIAGNOSES:  1. Esophageal stricture, dilated to 15 mm. 2.  Chronic gastritis. 3.  Normal bulb. 4.  [place] stricture, second portion of the duodenum, unchanged. SURGEON:  Hattie Cohen MD.    INDICATIONS:  He is 82. Known stricture, recurrently dilated over time  about once a year, but done over a period of two sessions. Endoscopic  evaluation was completed two weeks ago where he was dilated from less  than 9 mm to 12 mm. A followup is planned at this point to achieve a 15  mm balloon diameter. Preop is monitored anesthesia care. DESCRIPTION OF PROCEDURE:  He was placed left lateral and sedated. A  bite block was in place. The Olympus GIF-H190 video endoscope was  guided into the cervical esophagus. Cords appeared normal.  There was  no luminal content. The stricture was encountered at the GE junction  without inflammatory changes. The endoscope which did not pass into at  the time of last endoscopy passed easily into the stomach. There was  gastritis, but this was limited to the distal stomach and was  mild-to-moderate without erosions. Proximal stomach was normal.   Pylorus and bulb were normal and a bland stricture in the second portion  of the duodenum as previously noted. The endoscope was then withdrawn  to the GE junction and a TTS balloon beginning at 10 mm was inflated  through 12 mm and then deflated. There was no mucosal disruption. The  TTS balloon was inflated to 13.5 mm and held for 40 seconds. Deflated. There was a modest disruption. Inflated to 15 mm and held for 30  seconds. Deflated. There were two mucosal disruptions at this point. The endoscope slid easily. No bleeding occurred. Terminated and well  tolerated. IMPRESSION:  Dilated again to 15 mm and he usually gets a year out of  this.         Cammie Ladd MD    D: 02/14/2023 10:47:00       T: 02/14/2023 10:50:34     /[email or web address]  Job#: [id number]     Doc#: [id number]    CC:  Manjit Hills MD

## 2023-03-31 ENCOUNTER — PREP FOR PROCEDURE (OUTPATIENT)
Dept: GASTROENTEROLOGY | Age: 88
End: 2023-03-31

## 2023-08-28 NOTE — PROGRESS NOTES
[address] PRE-ADMISSION TESTING INSTRUCTIONS      ARRIVAL INSTRUCTIONS:  [x] Parking the day of Surgery is located in the Main Entrance lot. Upon entering the main door make an immediate right to the surgery reception desk. [x] Bring photo ID and insurance card    [] Bring in a copy of Living will or Durable Power of  papers. [x] Please be sure to arrange for responsible adult to provide transportation to and from the hospital    [x] Please arrange for responsible adult to be with you for the 24 hour period post procedure due to having anesthesia    [x] If you awake am of surgery not feeling well or have temperature >100 please call [phone]    GENERAL INSTRUCTIONS:    [x] Nothing by mouth after midnight, including gum, candy, mints or water    [x] You may brush your teeth, but do not swallow any water    [x] Take medications as instructed with 1-2 oz of water    [x] Stop herbal supplements and vitamins 5 days prior to procedure    [x] Follow preop dosing of blood thinners per physician instructions    [x] Take 1/2 dose of evening insulin, but no insulin after midnight    [x] No oral diabetic medications after midnight    [x] If diabetic and have low blood sugar or feel symptomatic, take 1-2oz apple juice only    [] Bring inhalers day of surgery    [] Bring C-PAP/ Bi-Pap day of surgery    [] Bring urine specimen day of surgery    [x] Shower or bath with soap, lather and rinse well, AM of Surgery, no lotion, powders or creams to surgical site    [] Follow bowel prep as instructed per surgeon    [x] No tobacco products within 24 hours of surgery     [x] No alcohol or illegal drug use within 24 hours of surgery.     [x] Jewelry, body piercing's, eyeglasses, contact lenses and dentures are not permitted into surgery (bring cases)       Please do not wear any nail polish, make up or hair p[x]roducts on the day of surgery    [x] You can expect a call the business day prior to

## 2023-08-28 NOTE — PROGRESS NOTES
Pt with Hx CAD CABG 2004 next appt with cardiology 10/2023 pt denies any cardiac complaints/symptoms.

## 2023-09-01 ENCOUNTER — PREP FOR PROCEDURE (OUTPATIENT)
Dept: GASTROENTEROLOGY | Age: 88
End: 2023-09-01

## 2023-09-01 RX ORDER — SODIUM CHLORIDE 0.9 % (FLUSH) 0.9 %
5-40 SYRINGE (ML) INJECTION PRN
Status: CANCELLED | OUTPATIENT
Start: 2023-09-01

## 2023-09-01 RX ORDER — SODIUM CHLORIDE 9 MG/ML
INJECTION, SOLUTION INTRAVENOUS PRN
Status: CANCELLED | OUTPATIENT
Start: 2023-09-01

## 2023-09-01 RX ORDER — SODIUM CHLORIDE 9 MG/ML
INJECTION, SOLUTION INTRAVENOUS CONTINUOUS
Status: CANCELLED | OUTPATIENT
Start: 2023-09-01

## 2023-09-01 RX ORDER — SODIUM CHLORIDE 0.9 % (FLUSH) 0.9 %
5-40 SYRINGE (ML) INJECTION EVERY 12 HOURS SCHEDULED
Status: CANCELLED | OUTPATIENT
Start: 2023-09-01

## 2023-09-01 NOTE — H&P (VIEW-ONLY)
REFERRING PHYSICIAN:  Cary Brown M.D. PROBLEM:  Dysphagia, known stricture. SUBJECTIVE:  He is now 82years of age. He was last seen under similar circumstances in February a year ago. He thought it longer. An endoscopic evaluation was completed again for a tight distal esophageal narrowing. The photographs would suggest the presence of a ring, but the area involved certainly seemed longer in length. There has never been evidence of peptic esophagitis and he was evaluated remotely through the office of Dr. Flaco Jara in 2008. At that time, the distal esophageal narrowing or stricture was identified and dilated by [facility] dilators. He was evaluated under similar circumstances for the first time through this office in 2013. He also had an incidental duodenal ulcer at that time without ever having documented recurrence. His last endoscopy was a year ago. Again, he thought it was longer when asked when his symptoms began to reemerge, he really was unable to say so. Medications may cause a problem. None of them seem particularly large, though he does take a multivitamin. He suggests he had problems with a doughnut the other day. His weight is unchanged from December 2020. He was evaluated by Urology last September for hematuria. That evaluation included at least a CT scan of the abdomen. His laboratory work in February of this year revealed a hemoglobin of 14.5 and hematocrit of 43. His creatinine was 1.24. His BUN was 18 and his liver function studies were normal.    PAST MEDICAL HISTORY:  Includes coronary artery bypass grafting in 2004, several colonoscopies and several endoscopic evaluations with esophageal dilatation, knee replacement, hypertension, and hyperlipidemia. MEDICATIONS:  Metoprolol, amlodipine, metformin, Crestor, multivitamin, and 81 mg of aspirin. ALLERGIES:  None known. OBJECTIVE:  Height 5 feet 10 inches tall, weight 198 pounds, blood pressure 128/70.   He is

## 2023-09-01 NOTE — H&P
REFERRING PHYSICIAN:  Lola Jordan M.D. PROBLEM:  Dysphagia, known stricture. SUBJECTIVE:  He is now 82years of age. He was last seen under similar circumstances in February a year ago. He thought it longer. An endoscopic evaluation was completed again for a tight distal esophageal narrowing. The photographs would suggest the presence of a ring, but the area involved certainly seemed longer in length. There has never been evidence of peptic esophagitis and he was evaluated remotely through the office of Dr. Roopa Barrios in 2008. At that time, the distal esophageal narrowing or stricture was identified and dilated by [facility] dilators. He was evaluated under similar circumstances for the first time through this office in 2013. He also had an incidental duodenal ulcer at that time without ever having documented recurrence. His last endoscopy was a year ago. Again, he thought it was longer when asked when his symptoms began to reemerge, he really was unable to say so. Medications may cause a problem. None of them seem particularly large, though he does take a multivitamin. He suggests he had problems with a doughnut the other day. His weight is unchanged from December 2020. He was evaluated by Urology last September for hematuria. That evaluation included at least a CT scan of the abdomen. His laboratory work in February of this year revealed a hemoglobin of 14.5 and hematocrit of 43. His creatinine was 1.24. His BUN was 18 and his liver function studies were normal.    PAST MEDICAL HISTORY:  Includes coronary artery bypass grafting in 2004, several colonoscopies and several endoscopic evaluations with esophageal dilatation, knee replacement, hypertension, and hyperlipidemia. MEDICATIONS:  Metoprolol, amlodipine, metformin, Crestor, multivitamin, and 81 mg of aspirin. ALLERGIES:  None known. OBJECTIVE:  Height 5 feet 10 inches tall, weight 198 pounds, blood pressure 128/70.   He is

## 2023-09-04 ENCOUNTER — ANESTHESIA EVENT (OUTPATIENT)
Dept: ENDOSCOPY | Age: 88
End: 2023-09-04
Payer: MEDICARE

## 2023-09-05 ENCOUNTER — HOSPITAL ENCOUNTER (OUTPATIENT)
Age: 88
Setting detail: OUTPATIENT SURGERY
Discharge: HOME | End: 2023-09-05
Attending: INTERNAL MEDICINE | Admitting: INTERNAL MEDICINE
Payer: MEDICARE

## 2023-09-05 ENCOUNTER — ANESTHESIA (OUTPATIENT)
Dept: ENDOSCOPY | Age: 88
End: 2023-09-05
Payer: MEDICARE

## 2023-09-05 VITALS
OXYGEN SATURATION: 98 % | TEMPERATURE: 97.1 F | DIASTOLIC BLOOD PRESSURE: 52 MMHG | WEIGHT: 185 LBS | SYSTOLIC BLOOD PRESSURE: 110 MMHG | RESPIRATION RATE: 18 BRPM | HEIGHT: 70 IN | HEART RATE: 62 BPM | BODY MASS INDEX: 26.48 KG/M2

## 2023-09-05 LAB — GLUCOSE BLD-MCNC: 120 MG/DL (ref 74–99)

## 2023-09-05 PROCEDURE — 7100000010 HC PHASE II RECOVERY - FIRST 15 MIN: Performed by: INTERNAL MEDICINE

## 2023-09-05 PROCEDURE — 2709999900 HC NON-CHARGEABLE SUPPLY: Performed by: INTERNAL MEDICINE

## 2023-09-05 PROCEDURE — C1726 CATH, BAL DIL, NON-VASCULAR: HCPCS | Performed by: INTERNAL MEDICINE

## 2023-09-05 PROCEDURE — 3700000001 HC ADD 15 MINUTES (ANESTHESIA): Performed by: INTERNAL MEDICINE

## 2023-09-05 PROCEDURE — 6360000002 HC RX W HCPCS

## 2023-09-05 PROCEDURE — 3700000000 HC ANESTHESIA ATTENDED CARE: Performed by: INTERNAL MEDICINE

## 2023-09-05 PROCEDURE — 2720000010 HC SURG SUPPLY STERILE: Performed by: INTERNAL MEDICINE

## 2023-09-05 PROCEDURE — 2580000003 HC RX 258

## 2023-09-05 PROCEDURE — 3609017700 HC EGD DILATION GASTRIC/DUODENAL STRICTURE: Performed by: INTERNAL MEDICINE

## 2023-09-05 PROCEDURE — 7100000011 HC PHASE II RECOVERY - ADDTL 15 MIN: Performed by: INTERNAL MEDICINE

## 2023-09-05 PROCEDURE — 82962 GLUCOSE BLOOD TEST: CPT

## 2023-09-05 RX ORDER — SODIUM CHLORIDE 9 MG/ML
INJECTION, SOLUTION INTRAVENOUS CONTINUOUS PRN
Status: DISCONTINUED | OUTPATIENT
Start: 2023-09-05 | End: 2023-09-05 | Stop reason: SDUPTHER

## 2023-09-05 RX ORDER — SODIUM CHLORIDE 0.9 % (FLUSH) 0.9 %
5-40 SYRINGE (ML) INJECTION EVERY 12 HOURS SCHEDULED
Status: DISCONTINUED | OUTPATIENT
Start: 2023-09-05 | End: 2023-09-05 | Stop reason: HOSPADM

## 2023-09-05 RX ORDER — SODIUM CHLORIDE 9 MG/ML
INJECTION, SOLUTION INTRAVENOUS CONTINUOUS
Status: DISCONTINUED | OUTPATIENT
Start: 2023-09-05 | End: 2023-09-05 | Stop reason: HOSPADM

## 2023-09-05 RX ORDER — SODIUM CHLORIDE 9 MG/ML
INJECTION, SOLUTION INTRAVENOUS PRN
Status: DISCONTINUED | OUTPATIENT
Start: 2023-09-05 | End: 2023-09-05 | Stop reason: HOSPADM

## 2023-09-05 RX ORDER — PROPOFOL 10 MG/ML
INJECTION, EMULSION INTRAVENOUS PRN
Status: DISCONTINUED | OUTPATIENT
Start: 2023-09-05 | End: 2023-09-05 | Stop reason: SDUPTHER

## 2023-09-05 RX ORDER — SODIUM CHLORIDE 0.9 % (FLUSH) 0.9 %
5-40 SYRINGE (ML) INJECTION PRN
Status: DISCONTINUED | OUTPATIENT
Start: 2023-09-05 | End: 2023-09-05 | Stop reason: HOSPADM

## 2023-09-05 RX ADMIN — PROPOFOL 200 MG: 10 INJECTION, EMULSION INTRAVENOUS at 07:49

## 2023-09-05 RX ADMIN — SODIUM CHLORIDE: 9 INJECTION, SOLUTION INTRAVENOUS at 07:19

## 2023-09-05 ASSESSMENT — PAIN SCALES - GENERAL
PAINLEVEL_OUTOF10: 0
PAINLEVEL_OUTOF10: 0

## 2023-09-05 ASSESSMENT — PAIN - FUNCTIONAL ASSESSMENT: PAIN_FUNCTIONAL_ASSESSMENT: 0-10

## 2023-09-05 ASSESSMENT — LIFESTYLE VARIABLES: SMOKING_STATUS: 0

## 2023-09-05 NOTE — ANESTHESIA PRE PROCEDURE
Component Value Date/Time    PROTIME 15.1 2013 10:50 PM    INR 1.6 2013 10:50 PM       HCG (If Applicable): No results found for: PREGTESTUR, PREGSERUM, HCG, HCGQUANT     ABGs: No results found for: PHART, PO2ART, BJC3SYT, RDU9QAP, BEART, I0IDFNVD     Type & Screen (If Applicable):  No results found for: LABABO, LABRH    Drug/Infectious Status (If Applicable):  No results found for: HIV, HEPCAB    COVID-19 Screening (If Applicable):   Lab Results   Component Value Date/Time    COVID19 Not Detected 2021 08:14 AM           Anesthesia Evaluation  Patient summary reviewed no history of anesthetic complications:   Airway: Mallampati: III  TM distance: <3 FB   Neck ROM: full  Mouth opening: > = 3 FB   Dental:          Pulmonary:normal exam  breath sounds clear to auscultation  (+) COPD:      (-) not a current smoker                          ROS comment: Former - 37.5 pack years  Quit Smokin83       Cardiovascular:  Exercise tolerance: good (>4 METS),   (+) hypertension: moderate and no interval change, CAD: obstructive, CABG/stent (CABG x 3): no interval change, murmur (Grade II), pulmonary hypertension (PASP 40 mm Hg): mild, hyperlipidemia      ECG reviewed  Rhythm: regular  Rate: normal           Beta Blocker:  Dose within 24 Hrs      ROS comment: EKG:  Sinus  Bradycardia  - with rate variation   cv = 17  WITHIN NORMAL LIMITS    ECHO 2017:   Summary   Normal left ventricular ejection fraction. Ejection fraction is visually estimated at 55-65%. Indeterminate diastolic function   The left atrium is mildly dilated. Mild centrally directed mitral regurgitation   The aortic valve appears mildly sclerotic. Pulmonary hypertension is mild . No pericardial effusion. Neuro/Psych:   Negative Neuro/Psych ROS              GI/Hepatic/Renal:            ROS comment: Dysphagia.    Endo/Other:    (+) Diabetes (Currently diet controlled)Type II DM, well controlled, , : arthritis: OA.,

## 2023-09-05 NOTE — DISCHARGE INSTRUCTIONS
Follow all instructions carefully:      Restrictions: Do not drive or operate machinery for eighteen hours after sedation. Diet:  Resume soft diet for 24 hrs       Medications: [unfilled]      Follow up Care: Follow-up: to be determined    If problems with abdominal pain, nausea, vomiting, bleeding or any other concerns call Dr. Marc Emery at [phone] or Answering Service [phone], If unable to reach me call [facility].     Ade Anthony MD M.D.

## 2023-09-05 NOTE — INTERVAL H&P NOTE
Update History & Physical    The patient's History and Physical of September 1, 2023 was reviewed with the patient and I examined the patient. There was no change. The surgical site was confirmed by the patient and me. Plan: The risks, benefits, expected outcome, and alternative to the recommended procedure have been discussed with the patient. Patient understands and wants to proceed with the procedure.      Electronically signed by Ramon To MD on 9/5/2023 at 8:06 AM

## 2023-09-05 NOTE — ANESTHESIA POSTPROCEDURE EVALUATION
Department of Anesthesiology  Postprocedure Note    Patient: Anh Singer  MRN: [id number]  YOB: 1935  Date of evaluation: 9/5/2023      Procedure Summary     Date: 09/05/23 Room / Location: [facility] 02 / [facility]    Anesthesia Start: 1280 Anesthesia Stop: 2837    Procedure: EGD DILATION BALLOON Diagnosis:       Dysphagia, unspecified type      (Dysphagia, unspecified type [R13.10])    Surgeons: Mendel Hua, MD Responsible Provider: Laine Cervantes MD    Anesthesia Type: MAC ASA Status: 3          Anesthesia Type: No value filed.     Brown Phase I: Brown Score: 10    Brown Phase II: Brown Score: 10      Anesthesia Post Evaluation    Patient location during evaluation: bedside (Endo)  Patient participation: complete - patient participated  Level of consciousness: awake and alert  Airway patency: patent  Nausea & Vomiting: no nausea and no vomiting  Complications: no  Cardiovascular status: hemodynamically stable  Respiratory status: acceptable  Hydration status: stable  Pain management: adequate

## 2023-09-05 NOTE — BRIEF OP NOTE
Brief Postoperative Note      Patient: Tiffanie Vigil  YOB: 1935  MRN: [id number]    Date of Procedure: 9/5/2023    Pre-Op Diagnosis Codes: * Dysphagia, unspecified type [R13.10]    Post-Op Diagnosis:  stricture dilated to 15 mm       Procedure(s):  EGD DILATION BALLOON    Surgeon(s):  Michael Childress MD    Assistant:  * No surgical staff found *    Anesthesia: Monitor Anesthesia Care    Estimated Blood Loss (mL): Minimal    Complications: None immediate    Specimens:   * No specimens in log *    Implants:  * No implants in log *      Drains: * No LDAs found *    Findings stricture at  jxn.       Electronically signed by Michael Childress MD on 9/5/2023 at 8:07 AM

## 2023-09-06 NOTE — OP NOTE
[address]                  [address] [facility], [address] [place] [address]                                OPERATIVE REPORT    PATIENT NAME: Ramos Collier                   :        1935  MED REC NO:   [id number]                            ROOM:  ACCOUNT NO:   [de-identified]                           ADMIT DATE: 2023  PROVIDER:     Kayley Pratt MD    DATE OF PROCEDURE:  2023    PROCEDURE PERFORMED:  Esophagogastroduodenoscopy plus balloon  dilatation. PREOPERATIVE DIAGNOSIS:  Recurrent distal esophageal stricture without  esophagitis or Bui's. POSTOPERATIVE DIAGNOSES:  1. Recurrent stricture, dilated to 15 mm. 2.  Normal stomach. 3.  [place] duodenal stricture, second portion of the duodenum as  previously noted. INDICATIONS:  He is an elderly gentleman with multiple comorbidities  with a recurrent distal esophageal stricture for which he has undergone  dilatation every 6 to 12 months over a period of years. He is once  again symptomatic. Preop is monitored anesthesia care. DESCRIPTION OF PROCEDURE:  With he in the left lateral and sedated and a  bite block in place, the Olympus GIF-H190 video endoscope was guided  into the cervical esophagus under direct vision. The esophagus was  normal proximally in its midportion, but a bland stricture was  identified at the GE junction for which the endoscope would not pass. Balloon dilators measuring 10, 11, and 12 were inflated. The endoscope  passed into the stomach. This provoked small amount of bleeding. Retroflexion of the proximal stomach was unremarkable. Distally, the  stomach was normal.  The pylorus was normal.  The bulb was normal, but  once again a bland stricture was noted in the second portion of the  duodenum. No attempt was made to pass through it. The endoscope was  withdrawn to the GE junction and balloon dilatation was carried out with  13.5 and 15 mm.   Procedure

## 2023-10-12 PROCEDURE — 88305 TISSUE EXAM BY PATHOLOGIST: CPT

## 2023-10-12 PROCEDURE — 88305 TISSUE EXAM BY PATHOLOGIST: CPT | Performed by: DERMATOLOGY

## 2023-10-16 ENCOUNTER — LAB REQUISITION (OUTPATIENT)
Dept: LAB | Facility: HOSPITAL | Age: 88
End: 2023-10-16
Payer: MEDICARE

## 2023-10-16 DIAGNOSIS — D48.5 NEOPLASM OF UNCERTAIN BEHAVIOR OF SKIN: ICD-10-CM

## 2023-10-23 PROCEDURE — 88305 TISSUE EXAM BY PATHOLOGIST: CPT | Performed by: DERMATOLOGY

## 2023-10-23 PROCEDURE — 88305 TISSUE EXAM BY PATHOLOGIST: CPT

## 2023-10-24 ENCOUNTER — LAB REQUISITION (OUTPATIENT)
Dept: LAB | Facility: HOSPITAL | Age: 88
End: 2023-10-24
Payer: MEDICARE

## 2023-10-24 DIAGNOSIS — C44.319 BASAL CELL CARCINOMA OF SKIN OF OTHER PARTS OF FACE: ICD-10-CM

## 2023-10-24 LAB
LABORATORY COMMENT REPORT: NORMAL
PATH REPORT.FINAL DX SPEC: NORMAL
PATH REPORT.GROSS SPEC: NORMAL
PATH REPORT.MICROSCOPIC SPEC OTHER STN: NORMAL
PATH REPORT.RELEVANT HX SPEC: NORMAL
PATH REPORT.TOTAL CANCER: NORMAL

## 2024-03-25 ENCOUNTER — HOSPITAL ENCOUNTER (OUTPATIENT)
Age: 89
Discharge: HOME | End: 2024-03-25
Payer: MEDICARE

## 2024-03-25 LAB
ALBUMIN SERPL-MCNC: 4.2 G/DL (ref 3.5–5.2)
ALP SERPL-CCNC: 48 U/L (ref 40–129)
ALT SERPL-CCNC: 14 U/L (ref 0–40)
ANION GAP SERPL CALCULATED.3IONS-SCNC: 8 MMOL/L (ref 7–16)
AST SERPL-CCNC: 15 U/L (ref 0–39)
BASOPHILS # BLD: 0.04 K/UL (ref 0–0.2)
BASOPHILS NFR BLD: 1 % (ref 0–2)
BILIRUB SERPL-MCNC: 0.4 MG/DL (ref 0–1.2)
BUN SERPL-MCNC: 18 MG/DL (ref 6–23)
CALCIUM SERPL-MCNC: 9.5 MG/DL (ref 8.6–10.2)
CHLORIDE SERPL-SCNC: 101 MMOL/L (ref 98–107)
CHOLEST SERPL-MCNC: 125 MG/DL
CO2 SERPL-SCNC: 27 MMOL/L (ref 22–29)
CREAT SERPL-MCNC: 1.2 MG/DL (ref 0.7–1.2)
EOSINOPHIL # BLD: 0.06 K/UL (ref 0.05–0.5)
EOSINOPHILS RELATIVE PERCENT: 1 % (ref 0–6)
ERYTHROCYTE [DISTWIDTH] IN BLOOD BY AUTOMATED COUNT: 13 % (ref 11.5–15)
GFR SERPL CREATININE-BSD FRML MDRD: 57 ML/MIN/1.73M2
GLUCOSE SERPL-MCNC: 119 MG/DL (ref 74–99)
HCT VFR BLD AUTO: 43.4 % (ref 37–54)
HDLC SERPL-MCNC: 47 MG/DL
HGB BLD-MCNC: 14.1 G/DL (ref 12.5–16.5)
IMM GRANULOCYTES # BLD AUTO: 0.05 K/UL (ref 0–0.58)
IMM GRANULOCYTES NFR BLD: 1 % (ref 0–5)
LDLC SERPL CALC-MCNC: 58 MG/DL
LYMPHOCYTES NFR BLD: 1.05 K/UL (ref 1.5–4)
LYMPHOCYTES RELATIVE PERCENT: 17 % (ref 20–42)
MCH RBC QN AUTO: 32.6 PG (ref 26–35)
MCHC RBC AUTO-ENTMCNC: 32.5 G/DL (ref 32–34.5)
MCV RBC AUTO: 100.2 FL (ref 80–99.9)
MONOCYTES NFR BLD: 0.43 K/UL (ref 0.1–0.95)
MONOCYTES NFR BLD: 7 % (ref 2–12)
NEUTROPHILS NFR BLD: 74 % (ref 43–80)
NEUTS SEG NFR BLD: 4.65 K/UL (ref 1.8–7.3)
PLATELET # BLD AUTO: 153 K/UL (ref 130–450)
PMV BLD AUTO: 11.6 FL (ref 7–12)
POTASSIUM SERPL-SCNC: 4.5 MMOL/L (ref 3.5–5)
PROT SERPL-MCNC: 6.8 G/DL (ref 6.4–8.3)
RBC # BLD AUTO: 4.33 M/UL (ref 3.8–5.8)
SODIUM SERPL-SCNC: 136 MMOL/L (ref 132–146)
TRIGL SERPL-MCNC: 101 MG/DL
TSH SERPL DL<=0.05 MIU/L-ACNC: 1.89 UIU/ML (ref 0.27–4.2)
VLDLC SERPL CALC-MCNC: 20 MG/DL
WBC OTHER # BLD: 6.3 K/UL (ref 4.5–11.5)

## 2024-03-25 PROCEDURE — 83036 HEMOGLOBIN GLYCOSYLATED A1C: CPT

## 2024-03-25 PROCEDURE — 85025 COMPLETE CBC W/AUTO DIFF WBC: CPT

## 2024-03-25 PROCEDURE — 80061 LIPID PANEL: CPT

## 2024-03-25 PROCEDURE — 84443 ASSAY THYROID STIM HORMONE: CPT

## 2024-03-25 PROCEDURE — 36415 COLL VENOUS BLD VENIPUNCTURE: CPT

## 2024-03-25 PROCEDURE — 80053 COMPREHEN METABOLIC PANEL: CPT

## 2024-03-26 LAB — HBA1C MFR BLD: 6.2 % (ref 4–5.6)

## 2024-04-09 ENCOUNTER — PREP FOR PROCEDURE (OUTPATIENT)
Dept: GASTROENTEROLOGY | Age: 89
End: 2024-04-09

## 2024-04-09 RX ORDER — SODIUM CHLORIDE 9 MG/ML
INJECTION, SOLUTION INTRAVENOUS PRN
Status: CANCELLED | OUTPATIENT
Start: 2024-04-09

## 2024-04-09 RX ORDER — SODIUM CHLORIDE 0.9 % (FLUSH) 0.9 %
5-40 SYRINGE (ML) INJECTION EVERY 12 HOURS SCHEDULED
Status: CANCELLED | OUTPATIENT
Start: 2024-04-09

## 2024-04-09 RX ORDER — SODIUM CHLORIDE 0.9 % (FLUSH) 0.9 %
5-40 SYRINGE (ML) INJECTION PRN
Status: CANCELLED | OUTPATIENT
Start: 2024-04-09

## 2024-04-09 RX ORDER — SODIUM CHLORIDE 9 MG/ML
INJECTION, SOLUTION INTRAVENOUS CONTINUOUS
Status: CANCELLED | OUTPATIENT
Start: 2024-04-09

## 2024-04-09 NOTE — H&P (VIEW-ONLY)
Patient: Henir Gee  MR#: [id number]  : 1935  Acct:[id number]  Age/Sex: 88 / M  ADM Date: 24  Loc: SPS.514            Provider Location:   cc: Glen Garcia MD~        Intake  Vital Signs      24  15:15  Height   5 ft 10 in  Weight   181 lb 2 oz  BMI   25.9  BP   136/67  Mean Arterial Pressure   90  Blood Pressure Location   Left Upper Arm  Position   Sitting  Respiration   16  Pulse Rate   54 L  Pulse Source   Monitor    Intake  Visit Reasons: Change in Bowel Habits  Current Pain: No  Fall Within Last 3 Months: No  Work/School/Activity Note Needed: No  Allergies    No Known Allergies Allergy (Verified 24 15:19)      Safety Concerns: Feels Safe At This Time    [facility]  Medical History (Updated 24 @ 12:31 by Gary Liu MD)    Esophageal stricture  Hematuria  History of esophageal dilatation  Multiple  Type 2 diabetes mellitus without complications  Osteoarthritis of left knee  CAD (coronary artery disease)  Hypertension  Hyperlipidemia      Surgical History (Updated 24 @ 12:31 by Gary Liu MD)    History of coronary artery bypass surgery  Hx of tonsillectomy  Hx of joint replacement  Hx of hernia repair  Hx of coronary artery bypass graft  Hx of colonoscopy  2008, 2015      Family History (Reviewed 24 @ 08:59 by Thania Edge NP)  Heart disease  Mother  Cancer  Father      Social History (Updated 24 @ 08:21 by Adelaide Min MA)  Types:  Coffee Cups Per Day: 2 and Soda Amount Per Day: 1   Smoking Status:  Former Smoker Tobacco Type: Cigarettes   Alcohol Intake:  Current Alcohol Type: Beer   Recreational Drug Type:  None         HPI  History of Present Illness:   We had pretty regular contact over the last several years requiring esophageal dilatations for bland but somewhat tight stricture which has been recurrent over the years.  We used to get by yearly dilatation now its about every 6 months with the last examination

## 2024-04-09 NOTE — H&P
Patient: Henri Gee  MR#: [id number]  : 1935  Acct:[id number]  Age/Sex: 88 / M  ADM Date: 24  Loc: SPS.514            Provider Location:   cc: Glen Gacria MD~        Intake  Vital Signs      24  15:15  Height   5 ft 10 in  Weight   181 lb 2 oz  BMI   25.9  BP   136/67  Mean Arterial Pressure   90  Blood Pressure Location   Left Upper Arm  Position   Sitting  Respiration   16  Pulse Rate   54 L  Pulse Source   Monitor    Intake  Visit Reasons: Change in Bowel Habits  Current Pain: No  Fall Within Last 3 Months: No  Work/School/Activity Note Needed: No  Allergies    No Known Allergies Allergy (Verified 24 15:19)      Safety Concerns: Feels Safe At This Time    [facility]  Medical History (Updated 24 @ 12:31 by Gary Liu MD)    Esophageal stricture  Hematuria  History of esophageal dilatation  Multiple  Type 2 diabetes mellitus without complications  Osteoarthritis of left knee  CAD (coronary artery disease)  Hypertension  Hyperlipidemia      Surgical History (Updated 24 @ 12:31 by Gary Liu MD)    History of coronary artery bypass surgery  Hx of tonsillectomy  Hx of joint replacement  Hx of hernia repair  Hx of coronary artery bypass graft  Hx of colonoscopy  2008, 2015      Family History (Reviewed 24 @ 08:59 by Thania Edge NP)  Heart disease  Mother  Cancer  Father      Social History (Updated 24 @ 08:21 by Adelaide Min MA)  Types:  Coffee Cups Per Day: 2 and Soda Amount Per Day: 1   Smoking Status:  Former Smoker Tobacco Type: Cigarettes   Alcohol Intake:  Current Alcohol Type: Beer   Recreational Drug Type:  None         HPI  History of Present Illness:   We had pretty regular contact over the last several years requiring esophageal dilatations for bland but somewhat tight stricture which has been recurrent over the years.  We used to get by yearly dilatation now its about every 6 months with the last examination

## 2024-04-16 NOTE — PROGRESS NOTES
PSYCHIATRIC INITIAL EVALUATION      CHIEF COMPLAINT:  \"too much in a hurry all the time\"    HISTORY OF PRESENT ILLNESS: Henri Gee is a 89 y.o. male who presents for psychiatric evaluation at [facility]. History of esophageal narrowing, knee replacement, HTN, hyperlipidemia, CAD, DM, coronary artery bypass grafting in 2004 and dysphagia.  On assessment patient is alert and oriented pleasant and cooperative.  His wife is with him  this appointment.   Patient is having health issues and his son is having health issues and he is worrying about him. Having more stress in his life. Patient has been more irritable for two months. Started on Lexapro  and Trazodone 3 weeks ago from PCP and feels better.  Patient states that he is more stable since he has started Lexapro and trazodone.  \"Feeling much better.  I was going to cancel the appointment but we decided to come.\"  Patient reports that he was feeling anxiety due to medical issues going on in his life.  \"This has happened to me before years ago when I had medical issues.\"  Patient reports that he has never seen a mental health provider before and Lexapro is his first SSRI.  Patient has no history of mental health.  No history of inpatient psychiatric admissions.  Patient is not suicidal, homicidal, manic or psychotic.  No voiced delusions or hallucinations    Anxiety: \"not too bad\"  Depression: denies  Moni: denies  SI/HI: denies  Hallucinations: denies  Paranoia: denies  Sleep: 6-7 hours with trazodone  Appetite: difficulty with eating due to esophageal narrowing.  Able to drink without any difficulty    OARRS: Reviewed. 000    PAST PSYCHIATRIC HISTORY: Denies any history of mental health no history of inpatient psychiatric admission. No history of suicide attempts.  No history of self-harm.  Has never seen a psychiatrist or a mental health counselor before.    PAST MEDICAL HISTORY:       Diagnosis Date    CAD (coronary artery disease)

## 2024-04-16 NOTE — PROGRESS NOTES
[facility] PRE-ADMISSION TESTING INSTRUCTIONS    The Preadmission Testing patient is instructed accordingly using the following criteria (check applicable):    ARRIVAL INSTRUCTIONS:  [x] Parking the day of Surgery is located in the Main Entrance lot.  Upon entering the door, make an immediate right to the surgery reception desk    [x] Bring photo ID and insurance card    [] Bring in a copy of Living will or Durable Power of  papers.    [x] Please be sure to arrange for responsible adult to provide transportation to and from the hospital    [x] Please arrange for responsible adult to be with you for the 24 hour period post procedure due to having anesthesia    [x] If you awake am of surgery not feeling well or have temperature >100 please call [phone]    GENERAL INSTRUCTIONS:    [x] May have clear liquids until 2 hours prior to surgery. Examples include water, fruit juices (no pulp), jello, popsicles, black coffee or tea, beef or chicken broth.       May have light meal 6 hours prior to surgery. Example include toast and clear liquids.        No gum, candy or mints.    [x] You may brush your teeth, but do not swallow any water    [x] Take medications as instructed with 1-2 oz of water    [x] Stop herbal supplements and vitamins 5 days prior to procedure    [x] Follow preop dosing of blood thinners per physician instructions    [] Take 1/2 dose of evening insulin, but no insulin after midnight    [x] No oral diabetic medications after midnight    [] If diabetic and have low blood sugar or feel symptomatic, take 1-2oz apple juice only    [] Bring inhalers day of surgery    [] Bring C-PAP/ Bi-Pap day of surgery    [] Bring urine specimen day of surgery    [x] Shower or bath with soap, lather and rinse well, AM of Surgery, no lotion, powders or creams to surgical site    [] Follow bowel prep as instructed per surgeon    [x] No tobacco products within 24 hours of surgery     [x] No

## 2024-04-16 NOTE — PROGRESS NOTES
Dr. Hameed notified that Henri did not see Dr. Small in May,2023. He is very active. Works as a [employer]. He has no chest pain or SOB, per Dr. Hameed no further workup needed.

## 2024-04-17 ENCOUNTER — OFFICE VISIT (OUTPATIENT)
Age: 89
End: 2024-04-17

## 2024-04-17 DIAGNOSIS — F41.1 GAD (GENERALIZED ANXIETY DISORDER): Primary | ICD-10-CM

## 2024-04-17 PROCEDURE — 90792 PSYCH DIAG EVAL W/MED SRVCS: CPT

## 2024-04-18 ENCOUNTER — ANESTHESIA EVENT (OUTPATIENT)
Dept: ENDOSCOPY | Age: 89
End: 2024-04-18
Payer: MEDICARE

## 2024-04-18 RX ORDER — TRAZODONE HYDROCHLORIDE 50 MG/1
50 TABLET ORAL NIGHTLY
COMMUNITY

## 2024-04-18 ASSESSMENT — LIFESTYLE VARIABLES: SMOKING_STATUS: 0

## 2024-04-18 NOTE — ANESTHESIA PRE PROCEDURE
Department of Anesthesiology  Preprocedure Note       Name:  Henri Gee   Age:  89 y.o.  :  1935                                          MRN:  [id number]         Date:  2024      Surgeon: Surgeon(s):  Gary Liu MD    Procedure: Procedure(s):  ESOPHAGOGASTRODUODENOSCOPY POSSIBLE DILATATION    Medications prior to admission:   Prior to Admission medications    Medication Sig Start Date End Date Taking? Authorizing Provider   PSYLLIUM FIBER PO daily as needed 23  Yes Bebe Cam MD   NONFORMULARY at bedtime Anxiety med   Yes Bebe Cam MD   diphenhydrAMINE-APAP, sleep, (TYLENOL PM EXTRA STRENGTH)  MG tablet Take 1 tablet by mouth nightly as needed for Sleep    Bebe Cam MD   tamsulosin (FLOMAX) 0.4 MG capsule Take 1 capsule by mouth daily 22   Bebe Cam MD   amLODIPine (NORVASC) 5 MG tablet Take 1 tablet by mouth daily 20   Glen Garcia MD   metFORMIN (GLUCOPHAGE) 500 MG tablet Take 2 tablets by mouth 2 times daily (with meals) 20   Glen Garcia MD   metoprolol succinate (TOPROL XL) 50 MG extended release tablet Take 1 tablet by mouth daily 20   Glen Garcia MD   rosuvastatin (CRESTOR) 5 MG tablet Take 1 tablet by mouth daily 19   Glen Garcia MD   Handicap Placard MISC by Does not apply route Valid from 16 to 21  Dx osteoarthritis 16   Glen Garcia MD   acetaminophen (TYLENOL) 500 MG tablet Take 1,000 mg by mouth daily    Bebe Cam MD   aspirin 81 MG tablet Take 1 tablet by mouth daily    Bebe Cam MD       Current medications:    No current facility-administered medications for this encounter.     Current Outpatient Medications   Medication Sig Dispense Refill    PSYLLIUM FIBER PO daily as needed      NONFORMULARY at bedtime Anxiety med      diphenhydrAMINE-APAP, sleep, (TYLENOL PM EXTRA STRENGTH)  MG tablet Take 1 tablet

## 2024-04-19 ENCOUNTER — ANESTHESIA (OUTPATIENT)
Dept: ENDOSCOPY | Age: 89
End: 2024-04-19
Payer: MEDICARE

## 2024-04-19 ENCOUNTER — HOSPITAL ENCOUNTER (OUTPATIENT)
Age: 89
Setting detail: OUTPATIENT SURGERY
Discharge: HOME HEALTH | End: 2024-04-19
Attending: INTERNAL MEDICINE | Admitting: INTERNAL MEDICINE
Payer: MEDICARE

## 2024-04-19 VITALS
RESPIRATION RATE: 18 BRPM | DIASTOLIC BLOOD PRESSURE: 59 MMHG | BODY MASS INDEX: 25.77 KG/M2 | TEMPERATURE: 97.5 F | SYSTOLIC BLOOD PRESSURE: 107 MMHG | OXYGEN SATURATION: 96 % | WEIGHT: 180 LBS | HEIGHT: 70 IN | HEART RATE: 52 BPM

## 2024-04-19 LAB — GLUCOSE BLD-MCNC: 94 MG/DL (ref 74–99)

## 2024-04-19 PROCEDURE — 7100000011 HC PHASE II RECOVERY - ADDTL 15 MIN: Performed by: INTERNAL MEDICINE

## 2024-04-19 PROCEDURE — 2720000010 HC SURG SUPPLY STERILE: Performed by: INTERNAL MEDICINE

## 2024-04-19 PROCEDURE — 7100000010 HC PHASE II RECOVERY - FIRST 15 MIN: Performed by: INTERNAL MEDICINE

## 2024-04-19 PROCEDURE — 3700000001 HC ADD 15 MINUTES (ANESTHESIA): Performed by: INTERNAL MEDICINE

## 2024-04-19 PROCEDURE — 3609017700 HC EGD DILATION GASTRIC/DUODENAL STRICTURE: Performed by: INTERNAL MEDICINE

## 2024-04-19 PROCEDURE — 2580000003 HC RX 258: Performed by: INTERNAL MEDICINE

## 2024-04-19 PROCEDURE — 2709999900 HC NON-CHARGEABLE SUPPLY: Performed by: INTERNAL MEDICINE

## 2024-04-19 PROCEDURE — 82962 GLUCOSE BLOOD TEST: CPT

## 2024-04-19 PROCEDURE — 3700000000 HC ANESTHESIA ATTENDED CARE: Performed by: INTERNAL MEDICINE

## 2024-04-19 PROCEDURE — 2500000003 HC RX 250 WO HCPCS

## 2024-04-19 PROCEDURE — 6360000002 HC RX W HCPCS

## 2024-04-19 PROCEDURE — C1726 CATH, BAL DIL, NON-VASCULAR: HCPCS | Performed by: INTERNAL MEDICINE

## 2024-04-19 RX ORDER — SODIUM CHLORIDE 0.9 % (FLUSH) 0.9 %
5-40 SYRINGE (ML) INJECTION EVERY 12 HOURS SCHEDULED
Status: DISCONTINUED | OUTPATIENT
Start: 2024-04-19 | End: 2024-04-19 | Stop reason: HOSPADM

## 2024-04-19 RX ORDER — LIDOCAINE HYDROCHLORIDE 20 MG/ML
INJECTION, SOLUTION EPIDURAL; INFILTRATION; INTRACAUDAL; PERINEURAL PRN
Status: DISCONTINUED | OUTPATIENT
Start: 2024-04-19 | End: 2024-04-19 | Stop reason: SDUPTHER

## 2024-04-19 RX ORDER — SODIUM CHLORIDE 0.9 % (FLUSH) 0.9 %
5-40 SYRINGE (ML) INJECTION PRN
Status: DISCONTINUED | OUTPATIENT
Start: 2024-04-19 | End: 2024-04-19 | Stop reason: HOSPADM

## 2024-04-19 RX ORDER — PROPOFOL 10 MG/ML
INJECTION, EMULSION INTRAVENOUS PRN
Status: DISCONTINUED | OUTPATIENT
Start: 2024-04-19 | End: 2024-04-19 | Stop reason: SDUPTHER

## 2024-04-19 RX ORDER — SODIUM CHLORIDE 9 MG/ML
INJECTION, SOLUTION INTRAVENOUS PRN
Status: DISCONTINUED | OUTPATIENT
Start: 2024-04-19 | End: 2024-04-19 | Stop reason: HOSPADM

## 2024-04-19 RX ORDER — SODIUM CHLORIDE 9 MG/ML
INJECTION, SOLUTION INTRAVENOUS CONTINUOUS
Status: DISCONTINUED | OUTPATIENT
Start: 2024-04-19 | End: 2024-04-19 | Stop reason: HOSPADM

## 2024-04-19 RX ADMIN — LIDOCAINE HYDROCHLORIDE 4 MG: 20 INJECTION, SOLUTION EPIDURAL; INFILTRATION; INTRACAUDAL; PERINEURAL at 07:28

## 2024-04-19 RX ADMIN — PROPOFOL 180 MG: 10 INJECTION, EMULSION INTRAVENOUS at 07:28

## 2024-04-19 RX ADMIN — SODIUM CHLORIDE: 9 INJECTION, SOLUTION INTRAVENOUS at 07:08

## 2024-04-19 ASSESSMENT — PAIN - FUNCTIONAL ASSESSMENT
PAIN_FUNCTIONAL_ASSESSMENT: 0-10
PAIN_FUNCTIONAL_ASSESSMENT: NONE - DENIES PAIN

## 2024-04-19 NOTE — INTERVAL H&P NOTE
Update History & Physical    The patient's History and Physical of April 9, 2024 was reviewed with the patient and I examined the patient. There was no change. The surgical site was confirmed by the patient and me.     Plan: The risks, benefits, expected outcome, and alternative to the recommended procedure have been discussed with the patient. Patient understands and wants to proceed with the procedure.     Electronically signed by Gary Liu MD on 4/19/2024 at 6:40 AM

## 2024-04-19 NOTE — ANESTHESIA POSTPROCEDURE EVALUATION
Department of Anesthesiology  Postprocedure Note    Patient: Henri Gee  MRN: [id number]  YOB: 1935  Date of evaluation: 4/19/2024    Procedure Summary       Date: 04/19/24 Room / Location: [address]    Anesthesia Start: 0723 Anesthesia Stop: 0743    Procedure: ESOPHAGOGASTRODUODENOSCOPY DILATION BALLOON Diagnosis:       Dysphagia, unspecified type      (Dysphagia, unspecified type [R13.10])    Surgeons: Gary Liu MD Responsible Provider: Milena Weaver DO    Anesthesia Type: MAC ASA Status: 3            Anesthesia Type: No value filed.    Bronw Phase I: Brown Score: 10    Brown Phase II: Brown Score: 10    Anesthesia Post Evaluation    Patient location during evaluation: PACU  Patient participation: complete - patient participated  Level of consciousness: awake and alert  Nausea & Vomiting: no vomiting and no nausea  Cardiovascular status: hemodynamically stable  Respiratory status: acceptable and spontaneous ventilation  Hydration status: stable  Pain management: adequate    No notable events documented.

## 2024-04-19 NOTE — BRIEF OP NOTE
Brief Postoperative Note      Patient: Henri Gee  YOB: 1935  MRN: [id number]    Date of Procedure: 4/19/2024    Pre-Op Diagnosis Codes:     * Dysphagia, unspecified type [R13.10]    Post-Op Diagnosis: distal stricture dilated by TTS balloon to 15 cm duodenal stricture second portion       Procedure(s):  ESOPHAGOGASTRODUODENOSCOPY DILATION BALLOON    Surgeon(s):  Gary Liu MD    Assistant:  * No surgical staff found *    Anesthesia: * No anesthesia type entered *    Estimated Blood Loss (mL): Minimal    Complications: None immediate    Specimens:   * No specimens in log *    Implants:  * No implants in log *      Drains: * No LDAs found *    Findings:  Infection Present At Time Of Surgery (PATOS) (choose all levels that have infection present):  No infection present  Other Findings: none    Electronically signed by Gary Liu MD on 4/19/2024 at 7:42 AM

## 2024-04-19 NOTE — ADDENDUM NOTE
Addendum  created 04/19/24 0826 by Milena Weaver DO    Attestation recorded in Intraprocedure, Flowsheet accepted, Intraprocedure Attestations deleted, Intraprocedure Attestations filed

## 2024-04-19 NOTE — DISCHARGE INSTRUCTIONS
Follow all instructions carefully:      Restrictions: Do not drive or operate machinery for eighteen hours after sedation.    Diet:  liquids for 12 hrs       Medications: [unfilled]      Follow up Care:   Follow-up : not required    If problems with abdominal pain, nausea, vomiting, bleeding or any other concerns call [phone] or Answering Service [phone] or Dr cell phone [phone] If unable to reach me call [facility].    Gary Liu MD M.D.

## 2024-08-08 PROCEDURE — 88305 TISSUE EXAM BY PATHOLOGIST: CPT

## 2024-08-09 ENCOUNTER — LAB REQUISITION (OUTPATIENT)
Dept: DERMATOPATHOLOGY | Facility: CLINIC | Age: 89
End: 2024-08-09
Payer: MEDICARE

## 2024-08-09 DIAGNOSIS — D48.5 NEOPLASM OF UNCERTAIN BEHAVIOR OF SKIN: ICD-10-CM

## 2024-09-04 PROCEDURE — 88305 TISSUE EXAM BY PATHOLOGIST: CPT

## 2024-09-05 ENCOUNTER — LAB REQUISITION (OUTPATIENT)
Dept: DERMATOPATHOLOGY | Facility: CLINIC | Age: 89
End: 2024-09-05
Payer: MEDICARE

## 2024-09-05 DIAGNOSIS — C44.712 BASAL CELL CARCINOMA OF SKIN OF RIGHT LOWER LIMB, INCLUDING HIP: ICD-10-CM

## 2024-12-05 NOTE — PROGRESS NOTES
[facility] PRE-ADMISSION TESTING INSTRUCTIONS    The Preadmission Testing patient is instructed accordingly using the following criteria (check applicable):    ARRIVAL INSTRUCTIONS:  [x] Parking the day of Surgery is located in the Main Entrance lot.  Upon entering the door, make an immediate right to the surgery reception desk    [x] Bring photo ID and insurance card    [] Bring in a copy of Living will or Durable Power of  papers.    [x] Please be sure to arrange for a responsible adult to provide transportation to and from the hospital    [x] Please arrange for a responsible adult to be with you for the 24 hour period post procedure due to having anesthesia    [x] If you awake am of surgery not feeling well or have temperature >100 please call [phone]    GENERAL INSTRUCTIONS:    [x] No solid foods after midnight, may have up to 8oz of water until 4 hours prior to surgery. No gum, no candy, no mints. NPO time:       [x] You may brush your teeth, do not swallow any toothpaste    [x] Take medications as instructed     [x] Stop herbal supplements and vitamins 5 days prior to procedure    [x] Follow preop dosing of blood thinners per physician instructions    [] Take 1/2 dose of evening insulin, but no insulin after midnight    [x] No oral diabetic medications after midnight    [x] If diabetic and have low blood sugar or feel symptomatic, take 1-2oz apple juice only    [] Bring inhalers day of surgery    [] Bring urine specimen day of surgery    [x] Shower or bath with soap, lather and rinse well, AM of Surgery, no lotion, powders or creams to surgical site    [x] Follow bowel prep as instructed per surgeon    [x] No tobacco products within 24 hours of surgery     [x] No alcohol or illegal drug use, marijuana included, within 24 hours of surgery.    [x] Jewelry, body piercing's, eyeglasses, contact lenses and dentures are not permitted into surgery (bring cases)      [x] Please

## 2024-12-05 NOTE — PROGRESS NOTES
Dr Meraz notified pt with hx CAD,CABG 2004 next appt with Dr Small 12/18/24. Pt denies any cardiac complaints/symptoms OK to proceed with EGD on 12/9/24

## 2024-12-06 ENCOUNTER — ANESTHESIA EVENT (OUTPATIENT)
Dept: ENDOSCOPY | Age: 89
End: 2024-12-06
Payer: MEDICARE

## 2024-12-09 ENCOUNTER — HOSPITAL ENCOUNTER (OUTPATIENT)
Age: 89
Setting detail: OUTPATIENT SURGERY
Discharge: HOME | End: 2024-12-09
Attending: INTERNAL MEDICINE | Admitting: INTERNAL MEDICINE
Payer: MEDICARE

## 2024-12-09 ENCOUNTER — PREP FOR PROCEDURE (OUTPATIENT)
Dept: GASTROENTEROLOGY | Age: 89
End: 2024-12-09

## 2024-12-09 ENCOUNTER — ANESTHESIA (OUTPATIENT)
Dept: ENDOSCOPY | Age: 89
End: 2024-12-09
Payer: MEDICARE

## 2024-12-09 VITALS
HEART RATE: 54 BPM | OXYGEN SATURATION: 97 % | BODY MASS INDEX: 25.77 KG/M2 | HEIGHT: 70 IN | RESPIRATION RATE: 17 BRPM | DIASTOLIC BLOOD PRESSURE: 78 MMHG | TEMPERATURE: 96.7 F | SYSTOLIC BLOOD PRESSURE: 138 MMHG | WEIGHT: 180 LBS

## 2024-12-09 LAB — GLUCOSE BLD-MCNC: 123 MG/DL (ref 74–99)

## 2024-12-09 PROCEDURE — 6360000002 HC RX W HCPCS: Performed by: NURSE ANESTHETIST, CERTIFIED REGISTERED

## 2024-12-09 PROCEDURE — 2720000010 HC SURG SUPPLY STERILE: Performed by: INTERNAL MEDICINE

## 2024-12-09 PROCEDURE — 2580000003 HC RX 258: Performed by: NURSE ANESTHETIST, CERTIFIED REGISTERED

## 2024-12-09 PROCEDURE — 82947 ASSAY GLUCOSE BLOOD QUANT: CPT

## 2024-12-09 PROCEDURE — 7100000010 HC PHASE II RECOVERY - FIRST 15 MIN: Performed by: INTERNAL MEDICINE

## 2024-12-09 PROCEDURE — 3700000000 HC ANESTHESIA ATTENDED CARE: Performed by: INTERNAL MEDICINE

## 2024-12-09 PROCEDURE — 3700000001 HC ADD 15 MINUTES (ANESTHESIA): Performed by: INTERNAL MEDICINE

## 2024-12-09 PROCEDURE — C1726 CATH, BAL DIL, NON-VASCULAR: HCPCS | Performed by: INTERNAL MEDICINE

## 2024-12-09 PROCEDURE — 2709999900 HC NON-CHARGEABLE SUPPLY: Performed by: INTERNAL MEDICINE

## 2024-12-09 PROCEDURE — 7100000011 HC PHASE II RECOVERY - ADDTL 15 MIN: Performed by: INTERNAL MEDICINE

## 2024-12-09 PROCEDURE — 3609017700 HC EGD DILATION GASTRIC/DUODENAL STRICTURE: Performed by: INTERNAL MEDICINE

## 2024-12-09 RX ORDER — SODIUM CHLORIDE 0.9 % (FLUSH) 0.9 %
5-40 SYRINGE (ML) INJECTION EVERY 12 HOURS SCHEDULED
Status: DISCONTINUED | OUTPATIENT
Start: 2024-12-09 | End: 2024-12-09 | Stop reason: HOSPADM

## 2024-12-09 RX ORDER — LIDOCAINE HYDROCHLORIDE 20 MG/ML
INJECTION, SOLUTION EPIDURAL; INFILTRATION; INTRACAUDAL; PERINEURAL
Status: DISCONTINUED | OUTPATIENT
Start: 2024-12-09 | End: 2024-12-09 | Stop reason: SDUPTHER

## 2024-12-09 RX ORDER — SODIUM CHLORIDE 0.9 % (FLUSH) 0.9 %
5-40 SYRINGE (ML) INJECTION PRN
Status: DISCONTINUED | OUTPATIENT
Start: 2024-12-09 | End: 2024-12-09 | Stop reason: HOSPADM

## 2024-12-09 RX ORDER — SODIUM CHLORIDE 9 MG/ML
INJECTION, SOLUTION INTRAVENOUS CONTINUOUS
Status: CANCELLED | OUTPATIENT
Start: 2024-12-09

## 2024-12-09 RX ORDER — SODIUM CHLORIDE 9 MG/ML
INJECTION, SOLUTION INTRAVENOUS PRN
Status: DISCONTINUED | OUTPATIENT
Start: 2024-12-09 | End: 2024-12-09 | Stop reason: HOSPADM

## 2024-12-09 RX ORDER — SODIUM CHLORIDE 9 MG/ML
INJECTION, SOLUTION INTRAVENOUS CONTINUOUS
Status: DISCONTINUED | OUTPATIENT
Start: 2024-12-09 | End: 2024-12-09 | Stop reason: HOSPADM

## 2024-12-09 RX ORDER — SODIUM CHLORIDE 9 MG/ML
INJECTION, SOLUTION INTRAVENOUS PRN
Status: CANCELLED | OUTPATIENT
Start: 2024-12-09

## 2024-12-09 RX ORDER — SODIUM CHLORIDE 0.9 % (FLUSH) 0.9 %
5-40 SYRINGE (ML) INJECTION PRN
Status: CANCELLED | OUTPATIENT
Start: 2024-12-09

## 2024-12-09 RX ORDER — GLYCOPYRROLATE 0.2 MG/ML
INJECTION INTRAMUSCULAR; INTRAVENOUS
Status: DISCONTINUED | OUTPATIENT
Start: 2024-12-09 | End: 2024-12-09 | Stop reason: SDUPTHER

## 2024-12-09 RX ORDER — SODIUM CHLORIDE 0.9 % (FLUSH) 0.9 %
5-40 SYRINGE (ML) INJECTION EVERY 12 HOURS SCHEDULED
Status: CANCELLED | OUTPATIENT
Start: 2024-12-09

## 2024-12-09 RX ORDER — PROPOFOL 10 MG/ML
INJECTION, EMULSION INTRAVENOUS
Status: DISCONTINUED | OUTPATIENT
Start: 2024-12-09 | End: 2024-12-09 | Stop reason: SDUPTHER

## 2024-12-09 RX ORDER — SODIUM CHLORIDE 9 MG/ML
INJECTION, SOLUTION INTRAVENOUS
Status: DISCONTINUED | OUTPATIENT
Start: 2024-12-09 | End: 2024-12-09 | Stop reason: SDUPTHER

## 2024-12-09 RX ADMIN — LIDOCAINE HYDROCHLORIDE 5 ML: 20 INJECTION, SOLUTION EPIDURAL; INFILTRATION; INTRACAUDAL; PERINEURAL at 12:36

## 2024-12-09 RX ADMIN — SODIUM CHLORIDE: 9 INJECTION, SOLUTION INTRAVENOUS at 12:21

## 2024-12-09 RX ADMIN — GLYCOPYRROLATE 0.2 MG: 0.2 INJECTION INTRAMUSCULAR; INTRAVENOUS at 12:35

## 2024-12-09 RX ADMIN — PROPOFOL 240 MG: 10 INJECTION, EMULSION INTRAVENOUS at 12:36

## 2024-12-09 ASSESSMENT — PAIN - FUNCTIONAL ASSESSMENT: PAIN_FUNCTIONAL_ASSESSMENT: 0-10

## 2024-12-09 NOTE — ANESTHESIA PRE PROCEDURE
Department of Anesthesiology  Preprocedure Note       Name:  Henri Gee   Age:  89 y.o.  :  1935                                          MRN:  [id number]         Date:  2024      Surgeon: Surgeon(s):  Gary Liu MD    Procedure: Procedure(s):  ESOPHAGOGASTRODUODENOSCOPY WITH DILATATION    Medications prior to admission:   Prior to Admission medications    Medication Sig Start Date End Date Taking? Authorizing Provider   traZODone (DESYREL) 50 MG tablet Take 1 tablet by mouth nightly    Bebe Cam MD   PSYLLIUM FIBER PO daily as needed 23   Bebe Cam MD   diphenhydrAMINE-APAP, sleep, (TYLENOL PM EXTRA STRENGTH)  MG tablet Take 1 tablet by mouth nightly as needed for Sleep    Bebe Cam MD   tamsulosin (FLOMAX) 0.4 MG capsule Take 1 capsule by mouth daily 22   Bebe Cam MD   amLODIPine (NORVASC) 5 MG tablet Take 1 tablet by mouth daily 20   Glen Garcia MD   metFORMIN (GLUCOPHAGE) 500 MG tablet Take 2 tablets by mouth 2 times daily (with meals) 20   Glen Garcia MD   metoprolol succinate (TOPROL XL) 50 MG extended release tablet Take 1 tablet by mouth daily 20   Glen Garcia MD   rosuvastatin (CRESTOR) 5 MG tablet Take 1 tablet by mouth daily 19   Glen Garcia MD   Handicap Placard MISC by Does not apply route Valid from 16 to 21  Dx osteoarthritis 16   Glen Garcia MD   aspirin 81 MG tablet Take 1 tablet by mouth daily    ProviderBebe MD       Current medications:    No current facility-administered medications for this encounter.       Allergies:  No Known Allergies    Problem List:    Patient Active Problem List   Diagnosis Code    Hyperlipidemia E78.5    Hypertension I10    Osteoarthritis M19.90    CAD (coronary artery disease) I25.10    Osteoarthritis of left knee M17.12    S/P CABG x 3 Z95.1    Type 2 diabetes mellitus without 
43.4 03/25/2024 10:14 AM    .2 03/25/2024 10:14 AM    RDW 13.0 03/25/2024 10:14 AM     03/25/2024 10:14 AM       CMP:   Lab Results   Component Value Date/Time     03/25/2024 10:14 AM    K 4.5 03/25/2024 10:14 AM     03/25/2024 10:14 AM    CO2 27 03/25/2024 10:14 AM    BUN 18 03/25/2024 10:14 AM    CREATININE 1.2 03/25/2024 10:14 AM    LABGLOM 57 03/25/2024 10:14 AM    GLUCOSE 119 03/25/2024 10:14 AM    CALCIUM 9.5 03/25/2024 10:14 AM    BILITOT 0.4 03/25/2024 10:14 AM    ALKPHOS 48 03/25/2024 10:14 AM    AST 15 03/25/2024 10:14 AM    ALT 14 03/25/2024 10:14 AM       POC Tests: No results for input(s): \"POCGLU\", \"POCNA\", \"POCK\", \"POCCL\", \"POCBUN\", \"POCHEMO\", \"POCHCT\" in the last 72 hours.    Coags:   Lab Results   Component Value Date/Time    PROTIME 15.1 03/25/2013 10:50 PM    INR 1.6 03/25/2013 10:50 PM       HCG (If Applicable): No results found for: \"PREGTESTUR\", \"PREGSERUM\", \"HCG\", \"HCGQUANT\"     ABGs: No results found for: \"PHART\", \"PO2ART\", \"PDO7TJJ\", \"TPV8TPT\", \"BEART\", \"H2CEMCHH\"     Type & Screen (If Applicable):  Lab Results   Component Value Date    ABORH A POS 02/18/2013    LABANTI Negative 02/18/2013       Drug/Infectious Status (If Applicable):  No results found for: \"HIV\", \"HEPCAB\"    COVID-19 Screening (If Applicable):   Lab Results   Component Value Date/Time    COVID19 Not Detected 02/11/2021 08:14 AM           Anesthesia Evaluation  Patient summary reviewed and Nursing notes reviewed   no history of anesthetic complications:   Airway:           Dental:          Pulmonary:       (-) not a current smoker (Former)                           Cardiovascular:  Exercise tolerance: good (>4 METS)  (+) hypertension: mild, valvular problems/murmurs: MR, CAD: obstructive, CABG/stent (S/P CABG x 3):, pulmonary hypertension (RVSP 40 mm Hg): mild, hyperlipidemia      ECG reviewed      Echocardiogram reviewed         Beta Blocker:  Not on Beta Blocker      ROS comment: EKG:  Sinus

## 2024-12-09 NOTE — ANESTHESIA POSTPROCEDURE EVALUATION
Department of Anesthesiology  Postprocedure Note    Patient: Henri Gee  MRN: [id number]  YOB: 1935  Date of evaluation: 12/9/2024    Procedure Summary       Date: 12/09/24 Room / Location: [place] [address] / [facility]    Anesthesia Start: 1232 Anesthesia Stop: 1301    Procedure: ESOPHAGOGASTRODUODENOSCOPY WITH DILATATION Diagnosis:       Esophageal stricture      (Esophageal stricture [K22.2])    Surgeons: Gary Liu MD Responsible Provider: Noman Tolbert MD    Anesthesia Type: MAC ASA Status: 3            Anesthesia Type: No value filed.    Brown Phase I: Brown Score: 10    Brown Phase II:      Anesthesia Post Evaluation    Patient location during evaluation: bedside  Patient participation: complete - patient participated  Level of consciousness: awake  Pain score: 0  Airway patency: patent  Nausea & Vomiting: no nausea and no vomiting  Cardiovascular status: blood pressure returned to baseline and hemodynamically stable  Respiratory status: acceptable  Hydration status: stable  Pain management: adequate and satisfactory to patient        No notable events documented.

## 2024-12-09 NOTE — BRIEF OP NOTE
Brief Postoperative Note      Patient: Henri Gee  YOB: 1935  MRN: [id number]    Date of Procedure: 12/9/2024    Pre-Op Diagnosis Codes:      * Esophageal stricture [K22.2]    Post-Op Diagnosis: Same       Procedure(s):  ESOPHAGOGASTRODUODENOSCOPY WITH DILATATION    Surgeon(s):  Gary Liu MD    Assistant:  * No surgical staff found *    Anesthesia: Monitor Anesthesia Care    Estimated Blood Loss (mL): Minimal    Complications: None    Specimens:   * No specimens in log *    Implants:  * No implants in log *      Drains: * No LDAs found *    Findings:  Infection Present At Time Of Surgery (PATOS) (choose all levels that have infection present):  No infection present  Other Findings chronic duodenal stricture    Electronically signed by Gary Liu MD on 12/9/2024 at 1:05 PM

## 2024-12-09 NOTE — INTERVAL H&P NOTE
Update History & Physical    The patient's History and Physical of November 20, 2024 was reviewed with the patient and I examined the patient. There was no change. The surgical site was confirmed by the patient and me.     Plan: The risks, benefits, expected outcome, and alternative to the recommended procedure have been discussed with the patient. Patient understands and wants to proceed with the procedure.     Electronically signed by Gary Liu MD on 12/9/2024 at 11:48 AM

## 2024-12-09 NOTE — H&P
PATIENT NAME: Henri Gee    ACCOUNT NUMBER: [id number]    YOB: 1935    DATE: 11/20/2024    REFERRING PHYSICIAN:  Glen Garcia M.D.    PROBLEM:  Dysphagia, known stricture.    SUBJECTIVE:  He is now 86 years of age.  He was last seen under similar circumstances in February a year ago.  He thought it longer.  An endoscopic evaluation was completed again for a tight distal esophageal narrowing.  The photographs would suggest the presence of a ring, but the area involved certainly seemed longer in length.  There has never been evidence of peptic esophagitis and he was evaluated remotely through the office of Dr. Tamez in 2008.  At that time, the distal esophageal narrowing or stricture was identified and dilated by Grajeda dilators.  He was evaluated under similar circumstances for the first time through this office in 2013.  He also had an incidental duodenal ulcer at that time without ever having documented recurrence.  His last endoscopy was a year ago.  Again, he thought it was longer when asked when his symptoms began to reemerge, he really was unable to say so.  Medications may cause a problem.  None of them seem particularly large, though he does take a multivitamin.  He suggests he had problems with a doughnut the other day.  His weight is unchanged from December 2020.    He was evaluated by Urology last September for hematuria.  That evaluation included at least a CT scan of the abdomen.  His laboratory work in February of this year revealed a hemoglobin of 14.5 and hematocrit of 43.  His creatinine was 1.24.  His BUN was 18 and his liver function studies were normal.    PAST MEDICAL HISTORY:  Includes coronary artery bypass grafting in 2004, several colonoscopies and several endoscopic evaluations with esophageal dilatation, knee replacement, hypertension, and hyperlipidemia.    MEDICATIONS:  Metoprolol, amlodipine, metformin, Crestor, multivitamin, and 81 mg of

## 2024-12-09 NOTE — DISCHARGE INSTRUCTIONS
Follow all instructions carefully:      Restrictions: Do not drive or operate machinery for eighteen hours after sedation.    Diet:  Liquids until tomorrow AM       Medications: [unfilled]      Follow up Care:   Follow-up : not required    If problems with abdominal pain, nausea, vomiting, bleeding or any other concerns call Dr. Liu at [phone] or Answering Service [phone], If unable to reach me call [facility].    Gary Liu MD M.D.

## 2024-12-09 NOTE — H&P (VIEW-ONLY)
PATIENT NAME: Henri Gee    ACCOUNT NUMBER: [id number]    YOB: 1935    DATE: 11/20/2024    REFERRING PHYSICIAN:  Glen Garcia M.D.    PROBLEM:  Dysphagia, known stricture.    SUBJECTIVE:  He is now 86 years of age.  He was last seen under similar circumstances in February a year ago.  He thought it longer.  An endoscopic evaluation was completed again for a tight distal esophageal narrowing.  The photographs would suggest the presence of a ring, but the area involved certainly seemed longer in length.  There has never been evidence of peptic esophagitis and he was evaluated remotely through the office of Dr. Tamez in 2008.  At that time, the distal esophageal narrowing or stricture was identified and dilated by Grajeda dilators.  He was evaluated under similar circumstances for the first time through this office in 2013.  He also had an incidental duodenal ulcer at that time without ever having documented recurrence.  His last endoscopy was a year ago.  Again, he thought it was longer when asked when his symptoms began to reemerge, he really was unable to say so.  Medications may cause a problem.  None of them seem particularly large, though he does take a multivitamin.  He suggests he had problems with a doughnut the other day.  His weight is unchanged from December 2020.    He was evaluated by Urology last September for hematuria.  That evaluation included at least a CT scan of the abdomen.  His laboratory work in February of this year revealed a hemoglobin of 14.5 and hematocrit of 43.  His creatinine was 1.24.  His BUN was 18 and his liver function studies were normal.    PAST MEDICAL HISTORY:  Includes coronary artery bypass grafting in 2004, several colonoscopies and several endoscopic evaluations with esophageal dilatation, knee replacement, hypertension, and hyperlipidemia.    MEDICATIONS:  Metoprolol, amlodipine, metformin, Crestor, multivitamin, and 81 mg of

## 2024-12-10 NOTE — OP NOTE
[place] [address]                            OPERATIVE REPORT      PATIENT NAME: SYED TAYLOR             : 1935  MED REC NO: [id number]                        ROOM: [facility] ROOM  ACCOUNT NO: [id number]                       ADMIT DATE: 2024  PROVIDER: Gary Liu MD      DATE OF PROCEDURE:  2024    SURGEON:  Gary Liu MD    PREOPERATIVE DIAGNOSIS:  Recurrent stricture of the esophagus.    POSTOPERATIVE DIAGNOSES:    1. [place] stricture distal esophagus not admitting a standard size adult endoscope, balloon dilatation 13.5 mm.  2. Unremarkable stomach.  3. [place] duodenal stricture unchanged, second portion of the duodenum.    INDICATION:  89-year-old male with a longstanding history of recurrent stricture without evident esophagitis and no features of other cause such as eosinophilic esophagitis routinely dilated about twice a year now.    Preop is monitored anesthesia care.    DESCRIPTION OF PROCEDURE:  He was placed left lateral, and a bite block was in place.  The Olympus GIF-H190 video endoscope was guided into the cervical esophagus after visualization of the oropharynx were there were no abnormalities.  The instrument was passed distally down to the gastroesophageal junction.  There was a symmetric bland stricture through which the endoscope would not pass.  There was no esophagitis.  Balloon dilatation was performed with an 8, 9, 10 mm balloon allowing entry into the stomach.  Inspection revealed an unremarkable gastric chamber.  The duodenal bulb was normal, and the postbulbar duodenum again revealed the presence of a bland duodenal stricture, which has been present for years.  It is asymptomatic.    The instrument was brought to the GE junction and balloon dilatation continued with an 11 and then 12 mm balloon, but the balloon malfunctions requiring removal of the endoscope.

## 2024-12-18 ENCOUNTER — OFFICE VISIT (OUTPATIENT)
Dept: CARDIOLOGY CLINIC | Age: 89
End: 2024-12-18
Payer: MEDICARE

## 2024-12-18 VITALS
HEIGHT: 70 IN | OXYGEN SATURATION: 95 % | TEMPERATURE: 97.5 F | DIASTOLIC BLOOD PRESSURE: 64 MMHG | BODY MASS INDEX: 25.71 KG/M2 | WEIGHT: 179.6 LBS | RESPIRATION RATE: 18 BRPM | HEART RATE: 53 BPM | SYSTOLIC BLOOD PRESSURE: 118 MMHG

## 2024-12-18 DIAGNOSIS — E11.9 TYPE 2 DIABETES MELLITUS WITHOUT COMPLICATION, WITHOUT LONG-TERM CURRENT USE OF INSULIN (HCC): ICD-10-CM

## 2024-12-18 DIAGNOSIS — I25.10 CORONARY ARTERY DISEASE INVOLVING NATIVE CORONARY ARTERY OF NATIVE HEART WITHOUT ANGINA PECTORIS: Primary | Chronic | ICD-10-CM

## 2024-12-18 DIAGNOSIS — I10 PRIMARY HYPERTENSION: ICD-10-CM

## 2024-12-18 DIAGNOSIS — Z95.1 S/P CABG X 3: ICD-10-CM

## 2024-12-18 PROCEDURE — 3044F HG A1C LEVEL LT 7.0%: CPT | Performed by: INTERNAL MEDICINE

## 2024-12-18 PROCEDURE — 1123F ACP DISCUSS/DSCN MKR DOCD: CPT | Performed by: INTERNAL MEDICINE

## 2024-12-18 PROCEDURE — 93000 ELECTROCARDIOGRAM COMPLETE: CPT | Performed by: INTERNAL MEDICINE

## 2024-12-18 PROCEDURE — 99213 OFFICE O/P EST LOW 20 MIN: CPT | Performed by: INTERNAL MEDICINE

## 2024-12-18 PROCEDURE — 1159F MED LIST DOCD IN RCRD: CPT | Performed by: INTERNAL MEDICINE

## 2024-12-18 RX ORDER — ACETAMINOPHEN 160 MG
2000 TABLET,DISINTEGRATING ORAL
COMMUNITY

## 2024-12-18 RX ORDER — ESCITALOPRAM OXALATE 5 MG/1
5 TABLET ORAL DAILY
COMMUNITY
Start: 2024-10-13

## 2024-12-18 NOTE — PROGRESS NOTES
atraumatic  Eyes: EOMI, no conjunctival erythema  ENMT: No pharyngeal erythema, MMM, no rhinorrhea  Neck: Supple, no carotid bruits  Lungs: Clear to auscultation bilaterally. No wheezes, rales, or rhonchi.  Cardiac: Regular rate and rhythm, 1/6 systolic murmur  Abdomen: Soft, nontender, +bowel sounds  Extremities: Moves all extremities x 4, no lower extremity edema  Neurologic: No focal motor deficits apparent, normal mood and affect, alert and oriented x 3    Laboratory Tests:  Lab Results   Component Value Date    CREATININE 1.2 03/25/2024    BUN 18 03/25/2024     03/25/2024    K 4.5 03/25/2024     03/25/2024    CO2 27 03/25/2024     Lab Results   Component Value Date    WBC 6.3 03/25/2024    HGB 14.1 03/25/2024    HCT 43.4 03/25/2024    .2 (H) 03/25/2024     03/25/2024     Lab Results   Component Value Date    ALT 14 03/25/2024    AST 15 03/25/2024    ALKPHOS 48 03/25/2024    BILITOT 0.4 03/25/2024     Lab Results   Component Value Date    INR 1.6 03/25/2013    INR 2.1 02/25/2013    INR 2.3 02/24/2013    PROTIME 15.1 (H) 03/25/2013    PROTIME 17.5 (H) 02/25/2013    PROTIME 18.4 (H) 02/24/2013     Lab Results   Component Value Date    LABA1C 6.2 (H) 03/25/2024     Cardiac Tests:  ECG: SB, rate 53, PAC's, QTc 400 msec, 1st degree AV block    Echocardiogram: 8/4/17 (Dr. Barragan)   Normal left ventricular ejection fraction.   Ejection fraction is visually estimated at 55-65%.  Normal right ventricular size and function.   Indeterminate diastolic function   The left atrium is mildly dilated.   Mild centrally directed mitral regurgitation   The aortic valve appears mildly sclerotic.   RVSP is 40 mmHg.   Pulmonary hypertension is mild .   No pericardial effusion.    Exercise nuclear stress test:  5/10/17 (Dr. Lewis)  1. Exercise EKG was negative.  2. The patient experienced no chest pain with exercise.   3. The myocardial perfusion imaging was normal.    4. Overall left ventricular systolic

## 2025-03-04 PROCEDURE — 88305 TISSUE EXAM BY PATHOLOGIST: CPT | Performed by: DERMATOLOGY

## 2025-03-05 ENCOUNTER — LAB REQUISITION (OUTPATIENT)
Dept: DERMATOPATHOLOGY | Facility: CLINIC | Age: 89
End: 2025-03-05
Payer: MEDICARE

## 2025-03-05 DIAGNOSIS — D48.5 NEOPLASM OF UNCERTAIN BEHAVIOR OF SKIN: ICD-10-CM

## 2025-05-28 NOTE — PROGRESS NOTES
[facility] PRE-ADMISSION TESTING INSTRUCTIONS    The Preadmission Testing patient is instructed accordingly using the following criteria (check applicable):    ARRIVAL INSTRUCTIONS:  [x] Parking the day of Surgery is located in the Main Entrance lot.  Upon entering the door, make an immediate right to the surgery reception desk    [x] Bring photo ID and insurance card    [] Bring in a copy of Living will or Durable Power of  papers.    [x] Please be sure to arrange for responsible adult to provide transportation to and from the hospital    [x] Please arrange for responsible adult to be with you for the 24 hour period post procedure due to having anesthesia    [x] If you awake am of surgery not feeling well or have temperature >100 please call [phone]    GENERAL INSTRUCTIONS:    [x] May have clear liquids until 4 hours prior to surgery. Examples include water, fruit juices (no pulp), jello, popsicles, black coffee or tea, beef or chicken broth.               No gum, candy or mints.    [x] You may brush your teeth, but do not swallow any water    [x] Take medications as instructed with 1-2 oz of water    [x] Stop herbal supplements and vitamins 5 days prior to procedure    [x] Follow preop dosing of blood thinners per physician instructions    [] Take 1/2 dose of evening insulin, but no insulin after midnight    [x] No oral diabetic medications after midnight    [x] If diabetic and have low blood sugar or feel symptomatic, take 1-2oz apple juice only    [] Bring inhalers day of surgery    [] Bring C-PAP/ Bi-Pap day of surgery    [] Bring urine specimen day of surgery    [] Shower or bath with soap, lather and rinse well, AM of Surgery, no lotion, powders or creams to surgical site    [] Follow bowel prep as instructed per surgeon    [x] No tobacco products within 24 hours of surgery     [x] No alcohol or illegal drug use within 24 hours of surgery.    [x] Jewelry, body

## 2025-05-29 ENCOUNTER — ANESTHESIA EVENT (OUTPATIENT)
Dept: ENDOSCOPY | Age: 89
End: 2025-05-29
Payer: MEDICARE

## 2025-05-29 NOTE — ANESTHESIA PRE PROCEDURE
Department of Anesthesiology  Preprocedure Note       Name:  Henri Gee   Age:  89 y.o.  :  1935                                          MRN:  [id number]         Date:  2025      Surgeon: Surgeon(s):  Gary Liu MD    Procedure: Procedure(s):  ESOPHAGOGASTRODUODENOSCOPY DILATATION    Medications prior to admission:   Prior to Admission medications    Medication Sig Start Date End Date Taking? Authorizing Provider   vitamin D (VITAMIN D3) 50 MCG (2000) CAPS capsule Take 1 capsule by mouth three times a week    Bebe Cam MD   escitalopram (LEXAPRO) 5 MG tablet Take 1 tablet by mouth daily 10/13/24   Bebe Cam MD   traZODone (DESYREL) 50 MG tablet Take 1 tablet by mouth nightly    Bebe Cam MD   PSYLLIUM FIBER PO daily as needed 23   Bebe Cam MD   diphenhydrAMINE-APAP, sleep, (TYLENOL PM EXTRA STRENGTH)  MG tablet Take 1 tablet by mouth nightly as needed for Sleep    Bebe Cam MD   tamsulosin (FLOMAX) 0.4 MG capsule Take 1 capsule by mouth daily 22   Bebe Cam MD   amLODIPine (NORVASC) 5 MG tablet Take 1 tablet by mouth daily 20   Glen Garcia MD   metFORMIN (GLUCOPHAGE) 500 MG tablet Take 2 tablets by mouth 2 times daily (with meals) 20   Glen Garcia MD   metoprolol succinate (TOPROL XL) 50 MG extended release tablet Take 1 tablet by mouth daily 20   Glen Garcia MD   rosuvastatin (CRESTOR) 5 MG tablet Take 1 tablet by mouth daily  Patient taking differently: Take 0.5 tablets by mouth daily 19   Glen Garcia MD   Handicap Placard MISC by Does not apply route Valid from 16 to 21  Dx osteoarthritis 16   Glen Garcia MD   aspirin 81 MG tablet Take 1 tablet by mouth daily    Bebe Cam MD       Current medications:    Current Facility-Administered Medications   Medication Dose Route Frequency Provider Last Rate

## 2025-05-30 ENCOUNTER — ANESTHESIA (OUTPATIENT)
Dept: ENDOSCOPY | Age: 89
End: 2025-05-30
Payer: MEDICARE

## 2025-05-30 ENCOUNTER — HOSPITAL ENCOUNTER (OUTPATIENT)
Age: 89
Setting detail: OUTPATIENT SURGERY
Discharge: HOME | End: 2025-05-30
Attending: INTERNAL MEDICINE | Admitting: INTERNAL MEDICINE
Payer: MEDICARE

## 2025-05-30 ENCOUNTER — PREP FOR PROCEDURE (OUTPATIENT)
Dept: GASTROENTEROLOGY | Age: 89
End: 2025-05-30

## 2025-05-30 VITALS
BODY MASS INDEX: 25.05 KG/M2 | TEMPERATURE: 97.1 F | SYSTOLIC BLOOD PRESSURE: 123 MMHG | HEIGHT: 70 IN | RESPIRATION RATE: 18 BRPM | OXYGEN SATURATION: 95 % | WEIGHT: 175 LBS | HEART RATE: 55 BPM | DIASTOLIC BLOOD PRESSURE: 61 MMHG

## 2025-05-30 LAB — GLUCOSE BLD-MCNC: 107 MG/DL (ref 74–99)

## 2025-05-30 PROCEDURE — 7100000010 HC PHASE II RECOVERY - FIRST 15 MIN: Performed by: INTERNAL MEDICINE

## 2025-05-30 PROCEDURE — 2580000003 HC RX 258: Performed by: INTERNAL MEDICINE

## 2025-05-30 PROCEDURE — 6360000002 HC RX W HCPCS: Performed by: NURSE ANESTHETIST, CERTIFIED REGISTERED

## 2025-05-30 PROCEDURE — 82962 GLUCOSE BLOOD TEST: CPT

## 2025-05-30 PROCEDURE — C1726 CATH, BAL DIL, NON-VASCULAR: HCPCS | Performed by: INTERNAL MEDICINE

## 2025-05-30 PROCEDURE — 3700000000 HC ANESTHESIA ATTENDED CARE: Performed by: INTERNAL MEDICINE

## 2025-05-30 PROCEDURE — 3700000001 HC ADD 15 MINUTES (ANESTHESIA): Performed by: INTERNAL MEDICINE

## 2025-05-30 PROCEDURE — 2709999900 HC NON-CHARGEABLE SUPPLY: Performed by: INTERNAL MEDICINE

## 2025-05-30 PROCEDURE — 3609017700 HC EGD DILATION GASTRIC/DUODENAL STRICTURE: Performed by: INTERNAL MEDICINE

## 2025-05-30 PROCEDURE — 7100000011 HC PHASE II RECOVERY - ADDTL 15 MIN: Performed by: INTERNAL MEDICINE

## 2025-05-30 RX ORDER — SODIUM CHLORIDE 0.9 % (FLUSH) 0.9 %
5-40 SYRINGE (ML) INJECTION PRN
Status: CANCELLED | OUTPATIENT
Start: 2025-05-30

## 2025-05-30 RX ORDER — SODIUM CHLORIDE 0.9 % (FLUSH) 0.9 %
5-40 SYRINGE (ML) INJECTION PRN
Status: DISCONTINUED | OUTPATIENT
Start: 2025-05-30 | End: 2025-05-30 | Stop reason: HOSPADM

## 2025-05-30 RX ORDER — SODIUM CHLORIDE 0.9 % (FLUSH) 0.9 %
5-40 SYRINGE (ML) INJECTION EVERY 12 HOURS SCHEDULED
Status: CANCELLED | OUTPATIENT
Start: 2025-05-30

## 2025-05-30 RX ORDER — SODIUM CHLORIDE 9 MG/ML
INJECTION, SOLUTION INTRAVENOUS CONTINUOUS
Status: CANCELLED | OUTPATIENT
Start: 2025-05-30

## 2025-05-30 RX ORDER — PROPOFOL 10 MG/ML
INJECTION, EMULSION INTRAVENOUS
Status: DISCONTINUED | OUTPATIENT
Start: 2025-05-30 | End: 2025-05-30 | Stop reason: SDUPTHER

## 2025-05-30 RX ORDER — SODIUM CHLORIDE 9 MG/ML
INJECTION, SOLUTION INTRAVENOUS CONTINUOUS
Status: DISCONTINUED | OUTPATIENT
Start: 2025-05-30 | End: 2025-05-30 | Stop reason: HOSPADM

## 2025-05-30 RX ORDER — LIDOCAINE HYDROCHLORIDE 20 MG/ML
INJECTION, SOLUTION EPIDURAL; INFILTRATION; INTRACAUDAL; PERINEURAL
Status: DISCONTINUED | OUTPATIENT
Start: 2025-05-30 | End: 2025-05-30 | Stop reason: SDUPTHER

## 2025-05-30 RX ORDER — SODIUM CHLORIDE 9 MG/ML
INJECTION, SOLUTION INTRAVENOUS PRN
Status: DISCONTINUED | OUTPATIENT
Start: 2025-05-30 | End: 2025-05-30 | Stop reason: HOSPADM

## 2025-05-30 RX ORDER — SODIUM CHLORIDE 0.9 % (FLUSH) 0.9 %
5-40 SYRINGE (ML) INJECTION EVERY 12 HOURS SCHEDULED
Status: DISCONTINUED | OUTPATIENT
Start: 2025-05-30 | End: 2025-05-30 | Stop reason: HOSPADM

## 2025-05-30 RX ORDER — SODIUM CHLORIDE 9 MG/ML
INJECTION, SOLUTION INTRAVENOUS PRN
Status: CANCELLED | OUTPATIENT
Start: 2025-05-30

## 2025-05-30 RX ADMIN — PROPOFOL 200 MG: 10 INJECTION, EMULSION INTRAVENOUS at 09:28

## 2025-05-30 RX ADMIN — LIDOCAINE HYDROCHLORIDE 5 ML: 20 INJECTION, SOLUTION EPIDURAL; INFILTRATION; INTRACAUDAL; PERINEURAL at 09:28

## 2025-05-30 RX ADMIN — SODIUM CHLORIDE: 9 INJECTION, SOLUTION INTRAVENOUS at 09:05

## 2025-05-30 ASSESSMENT — LIFESTYLE VARIABLES: SMOKING_STATUS: 0

## 2025-05-30 ASSESSMENT — PAIN - FUNCTIONAL ASSESSMENT: PAIN_FUNCTIONAL_ASSESSMENT: 0-10

## 2025-05-30 NOTE — H&P
[place] [address]                           HISTORY & PHYSICAL      PATIENT NAME: SYED TAYLOR             : 1935  MED REC NO: [id number]                        ROOM:   ACCOUNT NO: [id number]                       ADMIT DATE: 2025  PROVIDER: Martha Liu MD      PLANNED PROCEDURE:  Esophagogastroduodenoscopy plus esophageal dilatation.    HISTORY:  This is an 89-year-old male with a longstanding history of recurring esophageal stricture.  He undergoes esophageal dilatation approximately twice a year.  His last dilatation was performed in December of last year.  He has had no documented evidence of peptic esophagitis in the past.  He again has recurrent symptoms of dysphagia.  Dilatation is planned.    PAST MEDICAL HISTORY:  Coronary artery bypass grafting in  and follows locally through [facility], knee replacement, hypertension, hyperlipidemia.    MEDICATIONS:  Amlodipine, Lexapro, metformin, metoprolol, Crestor, tamsulosin, and trazodone.    ALLERGIES:  THERE ARE NO KNOWN ALLERGIES.      OBJECTIVE:  GENERAL:  Alert, elderly male.  VITAL SIGNS:  5 feet 10 inches tall.  Pulse regular.  Respirations easy and unlabored.  Cranial nerves intact.  HEART AND LUNGS:  Normal.  Rhythm regular.  No audible murmur.  No gallop.  ABDOMEN:  Soft, benign, nontender abdomen without organomegaly.  No dependent edema.    ASSESSMENT:  Recurrent esophageal stricture.    PLAN:  Dilatation.          MARTHA LIU MD      D:  2025 06:50:09     T:  2025 07:04:11     NASEEM/TATYANA  Job #:  [id number]     Doc#:  [id number]

## 2025-05-30 NOTE — ANESTHESIA POSTPROCEDURE EVALUATION
Department of Anesthesiology  Postprocedure Note    Patient: Henri Gee  MRN: [id number]  YOB: 1935  Date of evaluation: 5/30/2025    Procedure Summary       Date: 05/30/25 Room / Location: [address]    Anesthesia Start: 0922 Anesthesia Stop: 0944    Procedure: ESOPHAGOGASTRODUODENOSCOPY DILATATION Diagnosis:       Esophageal stricture      (Esophageal stricture [K22.2])    Surgeons: Gary Liu MD Responsible Provider: Milena Weaver DO    Anesthesia Type: MAC ASA Status: 3            Anesthesia Type: MAC    Brown Phase I: Brown Score: 10    Brown Phase II:      Anesthesia Post Evaluation    Patient location during evaluation: bedside  Patient participation: complete - patient participated  Level of consciousness: awake  Pain score: 0  Airway patency: patent  Nausea & Vomiting: no nausea and no vomiting  Cardiovascular status: blood pressure returned to baseline and hemodynamically stable  Respiratory status: acceptable  Hydration status: stable  Pain management: adequate and satisfactory to patient        No notable events documented.

## 2025-05-30 NOTE — BRIEF OP NOTE
Brief Postoperative Note      Patient: Henri Gee  YOB: 1935  MRN: [id number]    Date of Procedure: 5/30/2025    Pre-Op Diagnosis Codes:      * Esophageal stricture [K22.2]    Post-Op Diagnosis: Same dilated 8 mm thru 11 by TTS Balloon       Procedure(s):  ESOPHAGOGASTRODUODENOSCOPY DILATATION    Surgeon(s):  Gary Liu MD    Assistant:  * No surgical staff found *    Anesthesia: Monitor Anesthesia Care    Estimated Blood Loss (mL): Minimal    Complications: Noneduodenal stricture    Specimens:   * No specimens in log *    Implants:  * No implants in log *      Drains: * No LDAs found *    Findings:  Infection Present At Time Of Surgery (PATOS) (choose all levels that have infection present):  No infection present  Other Findings: duodenal stricture    Electronically signed by Gary Liu MD on 5/30/2025 at 9:50 AM

## 2025-06-02 NOTE — PROCEDURES
[facility]               [address] [place] [address]                             PROCEDURE NOTE      PATIENT NAME: SYED TAYLOR             : 1935  MED REC NO: [id number]                        ROOM: [facility] ROOM  ACCOUNT NO: [id number]                       ADMIT DATE: 2025  PROVIDER: Gary Liu MD      DATE OF PROCEDURE:  2025    SURGEON:  Gary Liu MD    PROCEDURE:  Esophagogastroduodenoscopy plus esophageal dilatation.    PREOPERATIVE DIAGNOSIS:  Chronic recurrent esophageal stricture.    POSTOPERATIVE DIAGNOSES:    1. Tight stricture, dilated from 8 to 11 mm.  2. Small hiatal hernia.  3. Unremarkable stomach.  4. [place] duodenal stricture, unchanged, second portion of duodenum.    INDICATIONS:  Elderly gentleman.  He has a history of recurrent distal esophageal strictures.  Symptoms of dysphagia have re-emerged significantly.    Preop is monitored anesthesia care.    DESCRIPTION OF PROCEDURE:  With him left lateral and sedated, a bite-block in place, the Olympus GIF-H190 video endoscope was guided first into the oropharynx and then gently guided into the cervical esophagus.  Proximal and mid esophagus were normal.  It was passed distally.  The stricture was encountered, once again bland.  It did not show any evidence of esophagitis, Bui's, or any suggestion of anything other than a bland recurrent stricture with the appearance of a thick ring.  The endoscope would not pass.  He was dilated with 8 through 11 mm.  The endoscope was then passed.  Stomach was unremarkable.  Small hiatal hernia.  The previously encountered long-standing asymptomatic duodenal stricture in the second portion of duodenum was again identified.  The endoscope was withdrawn.  Dilatation to 11 mm.  At this point, with the degree of destruction which occurs, it was decided to terminate and return in several weeks.  Well tolerated.    EBL:

## 2025-06-13 NOTE — PROGRESS NOTES
Pt with hx CAD,CABG x3 2004 follows yearly with Dr Small last seen 12/2024. Pt denies any cardiac complaints/symptoms.

## 2025-06-13 NOTE — PROGRESS NOTES
[facility] PRE-ADMISSION TESTING INSTRUCTIONS: [phone]  [address] [place] [address]    The Preadmission Testing patient is instructed accordingly using the following criteria (check applicable):    ARRIVAL INSTRUCTIONS:     Arrival Time:    [x] Parking the day of Surgery is located in the Main Entrance lot.  Upon entering through the main entrance (Entrance A) make an immediate right to the surgery reception desk    [x] Bring photo ID and insurance card    [] Bring in a copy of Living will or Durable Power of  papers.    [x] Please be sure to arrange for a responsible adult to provide transportation to and from the hospital    [x] Please arrange for a responsible adult to be with you for the 24 hour period post procedure, due to having anesthesia    [x] Please notify surgeon if you develop any illness between now and time of surgery (cold, cough, sore throat, fever, nausea, vomiting) or any signs of infections  including skin, wounds, and dental.    [x] If you awake am of surgery not feeling well or have temperature >100 please call [phone].    GENERAL INSTRUCTIONS:    [x] NOTHING BY MOUTH AFTER MIDNIGHT. You may only have up to 8oz of water from midnight until 4 hours prior to surgery. No gum, no candy, no mints.        [x] You may brush your teeth    [x] Take medications as instructed:    [] Bring inhalers day of surgery    [x] Stop herbal supplements and vitamins 5 days prior to procedure    [x] Follow preop dosing of blood thinners per physician instructions    [] Take 1/2 dose of long acting evening insulin night before surgery, but no insulin after midnight    [x] No oral diabetic medications after midnight    [x] If diabetic and have low blood sugar or feel symptomatic, take 1-2oz apple juice only    [x] Shower or bath with soap, lather and rinse well, AM of Surgery, no lotion, powders or creams to surgical site    [x] Please do not wear any nail polish,

## 2025-06-18 ENCOUNTER — ANESTHESIA EVENT (OUTPATIENT)
Dept: ENDOSCOPY | Age: 89
End: 2025-06-18
Payer: MEDICARE

## 2025-06-18 ASSESSMENT — LIFESTYLE VARIABLES: SMOKING_STATUS: 0

## 2025-06-18 NOTE — ANESTHESIA PRE PROCEDURE
Department of Anesthesiology  Preprocedure Note       Name:  Henri Gee   Age:  89 y.o.  :  1935                                          MRN:  [id number]         Date:  2025      Surgeon: Surgeon(s):  Gary Liu MD    Procedure: Procedure(s):  ESOPHAGOGASTRODUODENOSCOPY DILATATION    Medications prior to admission:   Prior to Admission medications    Medication Sig Start Date End Date Taking? Authorizing Provider   vitamin D (VITAMIN D3) 50 MCG (2000) CAPS capsule Take 1 capsule by mouth three times a week   Yes Bebe Cam MD   escitalopram (LEXAPRO) 5 MG tablet Take 1 tablet by mouth nightly 10/13/24   Bebe Cam MD   traZODone (DESYREL) 50 MG tablet Take 1 tablet by mouth nightly    Bebe Cam MD   PSYLLIUM FIBER PO daily as needed 23   Bebe Cam MD   diphenhydrAMINE-APAP, sleep, (TYLENOL PM EXTRA STRENGTH)  MG tablet Take 1 tablet by mouth nightly as needed for Sleep    Bebe Cam MD   tamsulosin (FLOMAX) 0.4 MG capsule Take 1 capsule by mouth daily 22   Bebe Cam MD   amLODIPine (NORVASC) 5 MG tablet Take 1 tablet by mouth daily 20   Glen Garcia MD   metFORMIN (GLUCOPHAGE) 500 MG tablet Take 2 tablets by mouth 2 times daily (with meals) 20   Glen Garcia MD   metoprolol succinate (TOPROL XL) 50 MG extended release tablet Take 1 tablet by mouth daily 20   Glen Garcia MD   rosuvastatin (CRESTOR) 5 MG tablet Take 1 tablet by mouth daily 19   Glen Garcia MD   Handicap Placard MISC by Does not apply route Valid from 16 to 21  Dx osteoarthritis 16   Glen Garcia MD   aspirin 81 MG tablet Take 1 tablet by mouth daily    Bebe Cam MD       Current medications:    No current facility-administered medications for this encounter.     Current Outpatient Medications   Medication Sig Dispense Refill    vitamin D (VITAMIN

## 2025-06-19 RX ORDER — SODIUM CHLORIDE 9 MG/ML
INJECTION, SOLUTION INTRAVENOUS PRN
Status: CANCELLED | OUTPATIENT
Start: 2025-06-19

## 2025-06-19 RX ORDER — SODIUM CHLORIDE 9 MG/ML
INJECTION, SOLUTION INTRAVENOUS CONTINUOUS
Status: CANCELLED | OUTPATIENT
Start: 2025-06-19

## 2025-06-19 RX ORDER — SODIUM CHLORIDE 0.9 % (FLUSH) 0.9 %
5-40 SYRINGE (ML) INJECTION EVERY 12 HOURS SCHEDULED
Status: CANCELLED | OUTPATIENT
Start: 2025-06-19

## 2025-06-19 RX ORDER — SODIUM CHLORIDE 0.9 % (FLUSH) 0.9 %
5-40 SYRINGE (ML) INJECTION PRN
Status: CANCELLED | OUTPATIENT
Start: 2025-06-19

## 2025-06-19 NOTE — H&P (VIEW-ONLY)
[place] [address]                            HISTORY & PHYSICAL        PATIENT NAME:        SYED TAYLOR             :   1935  MED REC NO:            [id number]                        ROOM:         ACCOUNT NO:           [id number]                       ADMIT DATE:       2025  PROVIDER:Gary Liu MD        PLANNED PROCEDURE:  Esophagogastroduodenoscopy plus esophageal dilatation.     HISTORY:  This is an 89-year-old male with a longstanding history of recurring esophageal stricture.  He undergoes esophageal dilatation approximately twice a year.  His last dilatation was performed in December of last year.  He has had no documented evidence of peptic esophagitis in the past.  He again has recurrent symptoms of dysphagia.  Dilatation is planned.     PAST MEDICAL HISTORY:  Coronary artery bypass grafting in  and follows locally through [facility], knee replacement, hypertension, hyperlipidemia.     MEDICATIONS:  Amlodipine, Lexapro, metformin, metoprolol, Crestor, tamsulosin, and trazodone.     ALLERGIES:  THERE ARE NO KNOWN ALLERGIES.        OBJECTIVE:  GENERAL:  Alert, elderly male.  VITAL SIGNS:  5 feet 10 inches tall.  Pulse regular.  Respirations easy and unlabored.  Cranial nerves intact.  HEART AND LUNGS:  Normal.  Rhythm regular.  No audible murmur.  No gallop.  ABDOMEN:  Soft, benign, nontender abdomen without organomegaly.  No dependent edema.     Gastrointestinal ROS: negative     ASSESSMENT:  Recurrent esophageal stricture.     PLAN:  Dilatation.

## 2025-06-19 NOTE — H&P
[place] [id number]                            HISTORY & PHYSICAL        PATIENT NAME:        SYED TAYLOR             :   1935  MED REC NO:            [id number]                        ROOM:         ACCOUNT NO:           [id number]                       ADMIT DATE:       2025  PROVIDER:Gary Liu MD        PLANNED PROCEDURE:  Esophagogastroduodenoscopy plus esophageal dilatation.     HISTORY:  This is an 89-year-old male with a longstanding history of recurring esophageal stricture.  He undergoes esophageal dilatation approximately twice a year.  His last dilatation was performed in December of last year.  He has had no documented evidence of peptic esophagitis in the past.  He again has recurrent symptoms of dysphagia.  Dilatation is planned.     PAST MEDICAL HISTORY:  Coronary artery bypass grafting in  and follows locally through [facility], knee replacement, hypertension, hyperlipidemia.     MEDICATIONS:  Amlodipine, Lexapro, metformin, metoprolol, Crestor, tamsulosin, and trazodone.     ALLERGIES:  THERE ARE NO KNOWN ALLERGIES.        OBJECTIVE:  GENERAL:  Alert, elderly male.  VITAL SIGNS:  5 feet 10 inches tall.  Pulse regular.  Respirations easy and unlabored.  Cranial nerves intact.  HEART AND LUNGS:  Normal.  Rhythm regular.  No audible murmur.  No gallop.  ABDOMEN:  Soft, benign, nontender abdomen without organomegaly.  No dependent edema.     Gastrointestinal ROS: negative     ASSESSMENT:  Recurrent esophageal stricture.     PLAN:  Dilatation.

## 2025-06-20 ENCOUNTER — ANESTHESIA (OUTPATIENT)
Dept: ENDOSCOPY | Age: 89
End: 2025-06-20
Payer: MEDICARE

## 2025-06-20 ENCOUNTER — HOSPITAL ENCOUNTER (OUTPATIENT)
Age: 89
Setting detail: OUTPATIENT SURGERY
Discharge: HOME | End: 2025-06-20
Attending: INTERNAL MEDICINE | Admitting: INTERNAL MEDICINE
Payer: MEDICARE

## 2025-06-20 VITALS
HEART RATE: 52 BPM | BODY MASS INDEX: 25.05 KG/M2 | HEIGHT: 70 IN | RESPIRATION RATE: 18 BRPM | SYSTOLIC BLOOD PRESSURE: 119 MMHG | DIASTOLIC BLOOD PRESSURE: 52 MMHG | OXYGEN SATURATION: 92 % | TEMPERATURE: 97 F | WEIGHT: 175 LBS

## 2025-06-20 LAB — GLUCOSE BLD-MCNC: 111 MG/DL (ref 74–99)

## 2025-06-20 PROCEDURE — 3700000000 HC ANESTHESIA ATTENDED CARE: Performed by: INTERNAL MEDICINE

## 2025-06-20 PROCEDURE — 7100000010 HC PHASE II RECOVERY - FIRST 15 MIN: Performed by: INTERNAL MEDICINE

## 2025-06-20 PROCEDURE — 2709999900 HC NON-CHARGEABLE SUPPLY: Performed by: INTERNAL MEDICINE

## 2025-06-20 PROCEDURE — 7100000011 HC PHASE II RECOVERY - ADDTL 15 MIN: Performed by: INTERNAL MEDICINE

## 2025-06-20 PROCEDURE — C1726 CATH, BAL DIL, NON-VASCULAR: HCPCS | Performed by: INTERNAL MEDICINE

## 2025-06-20 PROCEDURE — 82962 GLUCOSE BLOOD TEST: CPT

## 2025-06-20 PROCEDURE — 2720000010 HC SURG SUPPLY STERILE: Performed by: INTERNAL MEDICINE

## 2025-06-20 PROCEDURE — 6360000002 HC RX W HCPCS: Performed by: NURSE ANESTHETIST, CERTIFIED REGISTERED

## 2025-06-20 PROCEDURE — 3700000001 HC ADD 15 MINUTES (ANESTHESIA): Performed by: INTERNAL MEDICINE

## 2025-06-20 PROCEDURE — 2580000003 HC RX 258: Performed by: INTERNAL MEDICINE

## 2025-06-20 PROCEDURE — 3609017700 HC EGD DILATION GASTRIC/DUODENAL STRICTURE: Performed by: INTERNAL MEDICINE

## 2025-06-20 RX ORDER — LIDOCAINE HYDROCHLORIDE 20 MG/ML
INJECTION, SOLUTION EPIDURAL; INFILTRATION; INTRACAUDAL; PERINEURAL
Status: DISCONTINUED | OUTPATIENT
Start: 2025-06-20 | End: 2025-06-20 | Stop reason: SDUPTHER

## 2025-06-20 RX ORDER — PROPOFOL 10 MG/ML
INJECTION, EMULSION INTRAVENOUS
Status: DISCONTINUED | OUTPATIENT
Start: 2025-06-20 | End: 2025-06-20 | Stop reason: SDUPTHER

## 2025-06-20 RX ORDER — SODIUM CHLORIDE 9 MG/ML
INJECTION, SOLUTION INTRAVENOUS CONTINUOUS
Status: DISCONTINUED | OUTPATIENT
Start: 2025-06-20 | End: 2025-06-20 | Stop reason: HOSPADM

## 2025-06-20 RX ORDER — SODIUM CHLORIDE 9 MG/ML
INJECTION, SOLUTION INTRAVENOUS PRN
Status: DISCONTINUED | OUTPATIENT
Start: 2025-06-20 | End: 2025-06-20 | Stop reason: HOSPADM

## 2025-06-20 RX ORDER — SODIUM CHLORIDE 0.9 % (FLUSH) 0.9 %
5-40 SYRINGE (ML) INJECTION PRN
Status: DISCONTINUED | OUTPATIENT
Start: 2025-06-20 | End: 2025-06-20 | Stop reason: HOSPADM

## 2025-06-20 RX ORDER — SODIUM CHLORIDE 0.9 % (FLUSH) 0.9 %
5-40 SYRINGE (ML) INJECTION EVERY 12 HOURS SCHEDULED
Status: DISCONTINUED | OUTPATIENT
Start: 2025-06-20 | End: 2025-06-20 | Stop reason: HOSPADM

## 2025-06-20 RX ADMIN — PROPOFOL 270 MG: 10 INJECTION, EMULSION INTRAVENOUS at 07:30

## 2025-06-20 RX ADMIN — SODIUM CHLORIDE: 9 INJECTION, SOLUTION INTRAVENOUS at 07:15

## 2025-06-20 RX ADMIN — LIDOCAINE HYDROCHLORIDE 5 ML: 20 INJECTION, SOLUTION EPIDURAL; INFILTRATION; INTRACAUDAL; PERINEURAL at 07:30

## 2025-06-20 ASSESSMENT — PAIN - FUNCTIONAL ASSESSMENT: PAIN_FUNCTIONAL_ASSESSMENT: 0-10

## 2025-06-20 NOTE — DISCHARGE INSTRUCTIONS
Follow all instructions carefully:      Restrictions: Do not drive or operate machinery for eighteen hours after sedation.    Diet:  softFollow all instructions carefully:      Restrictions: Do not drive or operate machinery for eighteen hours after sedation.    Diet:  regular diet       Medications: [unfilled]      Follow up Care:   Follow-up : not required    If problems with abdominal pain, nausea, vomiting, bleeding or any other concerns call Dr. Liu at [phone] or Answering Service [phone], If unable to reach me call [facility].    Gary Liu MD M.D.           Medications: [unfilled]      Follow up Care:   Follow-up : not required    If problems with abdominal pain, nausea, vomiting, bleeding or any other concerns call Dr. Liu at [phone] or Answering Service [phone], If unable to reach me call [facility].    Gary Liu MD M.D.

## 2025-06-20 NOTE — BRIEF OP NOTE
Brief Postoperative Note      Patient: Henri Gee  YOB: 1935  MRN: [id number]    Date of Procedure: 6/20/2025    Pre-Op Diagnosis Codes:      * Esophageal stricture [K22.2]    Post-Op Diagnosis: Same       Procedure(s):  ESOPHAGOGASTRODUODENOSCOPY DILATATION    Surgeon(s):  Gary Liu MD    Assistant:  * No surgical staff found *    Anesthesia: Monitor Anesthesia Care    Estimated Blood Loss (mL): Minimal    Complications: None    Specimens:   * No specimens in log *    Implants:  * No implants in log *      Drains: * No LDAs found *    Findings:  Infection Present At Time Of Surgery (PATOS) (choose all levels that have infection present):  No infection present  Other Findings: 0    Electronically signed by Gary Liu MD on 6/20/2025 at 8:02 AM

## 2025-06-20 NOTE — ANESTHESIA POSTPROCEDURE EVALUATION
Department of Anesthesiology  Postprocedure Note    Patient: Henri Gee  MRN: [id number]  YOB: 1935  Date of evaluation: 6/20/2025    Procedure Summary       Date: 06/20/25 Room / Location: [place] [address] / [facility]    Anesthesia Start: 0726 Anesthesia Stop: 0749    Procedure: ESOPHAGOGASTRODUODENOSCOPY DILATATION Diagnosis:       Esophageal stricture      (Esophageal stricture [K22.2])    Surgeons: Gary Liu MD Responsible Provider: Trent Zabala DO    Anesthesia Type: MAC ASA Status: 3            Anesthesia Type: MAC    Brown Phase I: Brown Score: 10    Brown Phase II:      Anesthesia Post Evaluation    Patient location during evaluation: bedside  Patient participation: complete - patient participated  Level of consciousness: awake  Pain score: 0  Airway patency: patent  Nausea & Vomiting: no nausea and no vomiting  Cardiovascular status: blood pressure returned to baseline and hemodynamically stable  Respiratory status: acceptable  Hydration status: stable  Pain management: adequate and satisfactory to patient        No notable events documented.

## 2025-06-20 NOTE — INTERVAL H&P NOTE
Update History & Physical    The patient's History and Physical of June 30, 2025 was reviewed with the patient and I examined the patient. There was no change. The surgical site was confirmed by the patient and me.     Plan: The risks, benefits, expected outcome, and alternative to the recommended procedure have been discussed with the patient. Patient understands and wants to proceed with the procedure.     Electronically signed by Gary Liu MD on 6/20/2025 at 8:01 AM

## 2025-06-23 NOTE — OP NOTE
[place] [address]                            OPERATIVE REPORT      PATIENT NAME: SYED TAYLOR             : 1935  MED REC NO: [id number]                        ROOM: [facility] ROOM  ACCOUNT NO: [id number]                       ADMIT DATE: 2025  PROVIDER: Martha Liu MD      DATE OF PROCEDURE:  2025    SURGEON:  Martha Liu MD    PROCEDURE:  Esophagogastroduodenoscopy post balloon dilatation.    PREOPERATIVE DIAGNOSIS:  Recurrent distal esophageal stricture.    POSTOPERATIVE DIAGNOSES:    1. Recurrent stricture, dilated to 13.5 mm.  2. Normal stomach.  3. Duodenum not examined.    INDICATIONS:  An 89-year-old male.  He has a history of longstanding recurrent esophageal stricture.  He is generally dilated twice a year, but 2 weeks ago or so, he underwent esophageal dilatation, and stricture was tighter and only dilated up to 11 mm.  He returns for additional dilatation at this time.    Preop is monitored anesthesia care.    DESCRIPTION OF PROCEDURE:  He was sedated by Anesthesia.  A bite block was in place.  The Olympus GIF-H190 video endoscope was guided into the cervical esophagus.  Proximal and mid esophagus normal.  No esophageal retention.  A stricture was encountered, and the endoscope could not be passed.  Balloon dilatation 8 through 10 allowed entry into the stomach.  Stomach was examined, and duodenum I recently saw, so it was not entered.  Sequential dilatations were carried out to 13.5 mm.  The endoscope moved freely.  Procedure was terminated.  Well tolerated.    IMPRESSION:  He usually does well for 6 months with dilatation.  Conservative in approach.  Followup to be determined.        MARTHA LIU MD    D:  2025 11:55:22     T:  2025 12:43:02     NASEEM/TATYANA  Job #:  [id number]     Doc#:  [id number]

## (undated) DEVICE — ESOPHAGEAL BALLOON DILATATION CATHETER: Brand: CRE FIXED WIRE

## (undated) DEVICE — ESOPHAGEAL/PYLORIC WIREGUIDED BALLOON DILATATION CATHETER: Brand: CRE WIREGUIDED

## (undated) DEVICE — SPONGE GZ W4XL4IN RAYON POLY FILL CVR W/ NONWOVEN FAB STERILE

## (undated) DEVICE — SYRINGE INFL 60ML DISP ALLIANCE II

## (undated) DEVICE — CATHETER DIL 6FR L180CM BLLN INFLATED 36-40.5-45FR L8CM ES

## (undated) DEVICE — SPONGE GZ W4XL4IN RAYON POLY CVR W/NONWOVEN FAB STRL 2/PK

## (undated) DEVICE — BLOCK BITE 60FR RUBBER ADLT DENTAL

## (undated) DEVICE — GRADUATE TRIANG MEASURE 1000ML BLK PRNT

## (undated) DEVICE — SPONGE GZ W4XL4IN RAYON POLY FILL CVR W/ NONWOVEN FAB

## (undated) DEVICE — ESOPHAGEAL/PYLORIC/COLONIC WIREGUIDED BALLOON DILATATION CATHETER: Brand: CRE WIREGUIDED

## (undated) DEVICE — ESOPHAGEAL/PYLORIC/BILIARY WIREGUIDED BALLOON DILATATION CATHETER: Brand: CRE™ PRO

## (undated) DEVICE — Z INACTIVE USE 2855131 SPONGE GZ W4XL4IN RAYON POLY FILL CVR W/ NONWOVEN FAB

## (undated) DEVICE — GLOVE ORTHO 8   MSG9480